# Patient Record
Sex: FEMALE | Race: WHITE | Employment: FULL TIME | URBAN - METROPOLITAN AREA
[De-identification: names, ages, dates, MRNs, and addresses within clinical notes are randomized per-mention and may not be internally consistent; named-entity substitution may affect disease eponyms.]

---

## 2018-08-27 ENCOUNTER — HOSPITAL ENCOUNTER (EMERGENCY)
Age: 43
Discharge: HOME | End: 2018-08-28

## 2018-08-27 ENCOUNTER — APPOINTMENT (OUTPATIENT)
Dept: GENERAL RADIOLOGY | Age: 43
End: 2018-08-27

## 2018-08-27 DIAGNOSIS — R07.9 CHEST PAIN, UNSPECIFIED TYPE: Primary | ICD-10-CM

## 2018-08-27 LAB
A/G RATIO: 1.3 (ref 1.1–2.2)
ALBUMIN SERPL-MCNC: 3.6 G/DL (ref 3.4–5)
ALP BLD-CCNC: 71 U/L (ref 40–129)
ALT SERPL-CCNC: 17 U/L (ref 10–40)
ANION GAP SERPL CALCULATED.3IONS-SCNC: 13 MMOL/L (ref 3–16)
AST SERPL-CCNC: 14 U/L (ref 15–37)
BASOPHILS ABSOLUTE: 0.1 K/UL (ref 0–0.2)
BASOPHILS RELATIVE PERCENT: 1.4 %
BILIRUB SERPL-MCNC: 0.6 MG/DL (ref 0–1)
BUN BLDV-MCNC: 13 MG/DL (ref 7–20)
CALCIUM SERPL-MCNC: 8.7 MG/DL (ref 8.3–10.6)
CHLORIDE BLD-SCNC: 103 MMOL/L (ref 99–110)
CO2: 22 MMOL/L (ref 21–32)
CREAT SERPL-MCNC: 0.6 MG/DL (ref 0.6–1.1)
EOSINOPHILS ABSOLUTE: 0.3 K/UL (ref 0–0.6)
EOSINOPHILS RELATIVE PERCENT: 3.2 %
GFR AFRICAN AMERICAN: >60
GFR NON-AFRICAN AMERICAN: >60
GLOBULIN: 2.7 G/DL
GLUCOSE BLD-MCNC: 132 MG/DL (ref 70–99)
HCT VFR BLD CALC: 32.9 % (ref 36–48)
HEMOGLOBIN: 10.7 G/DL (ref 12–16)
LYMPHOCYTES ABSOLUTE: 2.2 K/UL (ref 1–5.1)
LYMPHOCYTES RELATIVE PERCENT: 23.2 %
MAGNESIUM: 1.9 MG/DL (ref 1.8–2.4)
MCH RBC QN AUTO: 23.9 PG (ref 26–34)
MCHC RBC AUTO-ENTMCNC: 32.7 G/DL (ref 31–36)
MCV RBC AUTO: 73 FL (ref 80–100)
MONOCYTES ABSOLUTE: 0.5 K/UL (ref 0–1.3)
MONOCYTES RELATIVE PERCENT: 5.1 %
NEUTROPHILS ABSOLUTE: 6.4 K/UL (ref 1.7–7.7)
NEUTROPHILS RELATIVE PERCENT: 67.1 %
PDW BLD-RTO: 16 % (ref 12.4–15.4)
PHOSPHORUS: 3.2 MG/DL (ref 2.5–4.9)
PLATELET # BLD: 256 K/UL (ref 135–450)
PMV BLD AUTO: 7.8 FL (ref 5–10.5)
POTASSIUM SERPL-SCNC: 3.3 MMOL/L (ref 3.5–5.1)
PRO-BNP: 59 PG/ML (ref 0–124)
RBC # BLD: 4.5 M/UL (ref 4–5.2)
SODIUM BLD-SCNC: 138 MMOL/L (ref 136–145)
TOTAL PROTEIN: 6.3 G/DL (ref 6.4–8.2)
TROPONIN: <0.01 NG/ML
WBC # BLD: 9.5 K/UL (ref 4–11)

## 2018-08-27 PROCEDURE — 83735 ASSAY OF MAGNESIUM: CPT

## 2018-08-27 PROCEDURE — 93005 ELECTROCARDIOGRAM TRACING: CPT | Performed by: PHYSICIAN ASSISTANT

## 2018-08-27 PROCEDURE — 85025 COMPLETE CBC W/AUTO DIFF WBC: CPT

## 2018-08-27 PROCEDURE — 99285 EMERGENCY DEPT VISIT HI MDM: CPT

## 2018-08-27 PROCEDURE — 6370000000 HC RX 637 (ALT 250 FOR IP): Performed by: PHYSICIAN ASSISTANT

## 2018-08-27 PROCEDURE — 84100 ASSAY OF PHOSPHORUS: CPT

## 2018-08-27 PROCEDURE — 83880 ASSAY OF NATRIURETIC PEPTIDE: CPT

## 2018-08-27 PROCEDURE — 80053 COMPREHEN METABOLIC PANEL: CPT

## 2018-08-27 PROCEDURE — 71046 X-RAY EXAM CHEST 2 VIEWS: CPT

## 2018-08-27 PROCEDURE — 84484 ASSAY OF TROPONIN QUANT: CPT

## 2018-08-27 RX ORDER — ASPIRIN 81 MG/1
324 TABLET, CHEWABLE ORAL ONCE
Status: COMPLETED | OUTPATIENT
Start: 2018-08-27 | End: 2018-08-27

## 2018-08-27 RX ORDER — POTASSIUM CHLORIDE 750 MG/1
40 TABLET, FILM COATED, EXTENDED RELEASE ORAL ONCE
Status: COMPLETED | OUTPATIENT
Start: 2018-08-27 | End: 2018-08-28

## 2018-08-27 RX ADMIN — ASPIRIN 81 MG 324 MG: 81 TABLET ORAL at 23:34

## 2018-08-27 ASSESSMENT — PAIN SCALES - GENERAL: PAINLEVEL_OUTOF10: 8

## 2018-08-27 ASSESSMENT — PAIN DESCRIPTION - PAIN TYPE: TYPE: ACUTE PAIN

## 2018-08-27 ASSESSMENT — PAIN DESCRIPTION - LOCATION: LOCATION: CHEST

## 2018-08-28 VITALS
TEMPERATURE: 98.5 F | HEART RATE: 84 BPM | WEIGHT: 230 LBS | BODY MASS INDEX: 36.96 KG/M2 | RESPIRATION RATE: 16 BRPM | OXYGEN SATURATION: 98 % | SYSTOLIC BLOOD PRESSURE: 139 MMHG | DIASTOLIC BLOOD PRESSURE: 88 MMHG | HEIGHT: 66 IN

## 2018-08-28 LAB — TROPONIN: <0.01 NG/ML

## 2018-08-28 PROCEDURE — 84484 ASSAY OF TROPONIN QUANT: CPT

## 2018-08-28 PROCEDURE — 6370000000 HC RX 637 (ALT 250 FOR IP): Performed by: PHYSICIAN ASSISTANT

## 2018-08-28 RX ADMIN — POTASSIUM CHLORIDE 40 MEQ: 750 TABLET, EXTENDED RELEASE ORAL at 00:10

## 2018-08-28 NOTE — ED PROVIDER NOTES
return to ED for new/worsening symptoms. No medications prescribed at discharge.     MDM  Results for orders placed or performed during the hospital encounter of 08/27/18   CBC Auto Differential   Result Value Ref Range    WBC 9.5 4.0 - 11.0 K/uL    RBC 4.50 4.00 - 5.20 M/uL    Hemoglobin 10.7 (L) 12.0 - 16.0 g/dL    Hematocrit 32.9 (L) 36.0 - 48.0 %    MCV 73.0 (L) 80.0 - 100.0 fL    MCH 23.9 (L) 26.0 - 34.0 pg    MCHC 32.7 31.0 - 36.0 g/dL    RDW 16.0 (H) 12.4 - 15.4 %    Platelets 982 000 - 688 K/uL    MPV 7.8 5.0 - 10.5 fL    Neutrophils % 67.1 %    Lymphocytes % 23.2 %    Monocytes % 5.1 %    Eosinophils % 3.2 %    Basophils % 1.4 %    Neutrophils # 6.4 1.7 - 7.7 K/uL    Lymphocytes # 2.2 1.0 - 5.1 K/uL    Monocytes # 0.5 0.0 - 1.3 K/uL    Eosinophils # 0.3 0.0 - 0.6 K/uL    Basophils # 0.1 0.0 - 0.2 K/uL   Comprehensive Metabolic Panel   Result Value Ref Range    Sodium 138 136 - 145 mmol/L    Potassium 3.3 (L) 3.5 - 5.1 mmol/L    Chloride 103 99 - 110 mmol/L    CO2 22 21 - 32 mmol/L    Anion Gap 13 3 - 16    Glucose 132 (H) 70 - 99 mg/dL    BUN 13 7 - 20 mg/dL    CREATININE 0.6 0.6 - 1.1 mg/dL    GFR Non-African American >60 >60    GFR African American >60 >60    Calcium 8.7 8.3 - 10.6 mg/dL    Total Protein 6.3 (L) 6.4 - 8.2 g/dL    Alb 3.6 3.4 - 5.0 g/dL    Albumin/Globulin Ratio 1.3 1.1 - 2.2    Total Bilirubin 0.6 0.0 - 1.0 mg/dL    Alkaline Phosphatase 71 40 - 129 U/L    ALT 17 10 - 40 U/L    AST 14 (L) 15 - 37 U/L    Globulin 2.7 g/dL   Troponin   Result Value Ref Range    Troponin <0.01 <0.01 ng/mL   Brain Natriuretic Peptide   Result Value Ref Range    Pro-BNP 59 0 - 124 pg/mL   Magnesium   Result Value Ref Range    Magnesium 1.90 1.80 - 2.40 mg/dL   Phosphorus   Result Value Ref Range    Phosphorus 3.2 2.5 - 4.9 mg/dL   Troponin   Result Value Ref Range    Troponin <0.01 <0.01 ng/mL     I estimate there is LOW risk for ACUTE CORONARY SYNDROME, INTRACRANIAL HEMORRHAGE, MALIGNANT DYSRHYTHMIA or

## 2018-08-29 LAB
EKG ATRIAL RATE: 102 BPM
EKG DIAGNOSIS: NORMAL
EKG P AXIS: 47 DEGREES
EKG P-R INTERVAL: 146 MS
EKG Q-T INTERVAL: 358 MS
EKG QRS DURATION: 86 MS
EKG QTC CALCULATION (BAZETT): 466 MS
EKG R AXIS: 51 DEGREES
EKG T AXIS: 8 DEGREES
EKG VENTRICULAR RATE: 102 BPM

## 2018-08-29 PROCEDURE — 93010 ELECTROCARDIOGRAM REPORT: CPT | Performed by: INTERNAL MEDICINE

## 2019-03-12 ENCOUNTER — APPOINTMENT (OUTPATIENT)
Dept: CT IMAGING | Age: 44
End: 2019-03-12

## 2019-03-12 ENCOUNTER — HOSPITAL ENCOUNTER (EMERGENCY)
Age: 44
Discharge: HOME | End: 2019-03-12
Attending: EMERGENCY MEDICINE

## 2019-03-12 VITALS
SYSTOLIC BLOOD PRESSURE: 125 MMHG | RESPIRATION RATE: 14 BRPM | OXYGEN SATURATION: 98 % | HEART RATE: 84 BPM | DIASTOLIC BLOOD PRESSURE: 70 MMHG | TEMPERATURE: 98.6 F

## 2019-03-12 DIAGNOSIS — R11.0 NAUSEA: Primary | ICD-10-CM

## 2019-03-12 DIAGNOSIS — R10.13 ABDOMINAL PAIN, EPIGASTRIC: ICD-10-CM

## 2019-03-12 DIAGNOSIS — R19.7 DIARRHEA, UNSPECIFIED TYPE: ICD-10-CM

## 2019-03-12 LAB
A/G RATIO: 1.2 (ref 1.1–2.2)
ALBUMIN SERPL-MCNC: 3.7 G/DL (ref 3.4–5)
ALP BLD-CCNC: 79 U/L (ref 40–129)
ALT SERPL-CCNC: 23 U/L (ref 10–40)
ANION GAP SERPL CALCULATED.3IONS-SCNC: 12 MMOL/L (ref 3–16)
AST SERPL-CCNC: 14 U/L (ref 15–37)
BACTERIA: ABNORMAL /HPF
BASOPHILS ABSOLUTE: 0.1 K/UL (ref 0–0.2)
BASOPHILS RELATIVE PERCENT: 1 %
BILIRUB SERPL-MCNC: 0.9 MG/DL (ref 0–1)
BILIRUBIN URINE: NEGATIVE
BLOOD, URINE: ABNORMAL
BUN BLDV-MCNC: 8 MG/DL (ref 7–20)
CALCIUM SERPL-MCNC: 8.8 MG/DL (ref 8.3–10.6)
CHLORIDE BLD-SCNC: 101 MMOL/L (ref 99–110)
CLARITY: CLEAR
CO2: 23 MMOL/L (ref 21–32)
COLOR: YELLOW
CREAT SERPL-MCNC: <0.5 MG/DL (ref 0.6–1.1)
EOSINOPHILS ABSOLUTE: 0 K/UL (ref 0–0.6)
EOSINOPHILS RELATIVE PERCENT: 0.7 %
EPITHELIAL CELLS, UA: ABNORMAL /HPF
GFR AFRICAN AMERICAN: >60
GFR NON-AFRICAN AMERICAN: >60
GLOBULIN: 3 G/DL
GLUCOSE BLD-MCNC: 90 MG/DL (ref 70–99)
GLUCOSE URINE: NEGATIVE MG/DL
HCG QUALITATIVE: NEGATIVE
HCT VFR BLD CALC: 37.5 % (ref 36–48)
HEMOGLOBIN: 12.2 G/DL (ref 12–16)
KETONES, URINE: NEGATIVE MG/DL
LEUKOCYTE ESTERASE, URINE: ABNORMAL
LIPASE: 25 U/L (ref 13–60)
LYMPHOCYTES ABSOLUTE: 1 K/UL (ref 1–5.1)
LYMPHOCYTES RELATIVE PERCENT: 15.4 %
MAGNESIUM: 1.7 MG/DL (ref 1.8–2.4)
MCH RBC QN AUTO: 24.8 PG (ref 26–34)
MCHC RBC AUTO-ENTMCNC: 32.7 G/DL (ref 31–36)
MCV RBC AUTO: 76 FL (ref 80–100)
MICROSCOPIC EXAMINATION: YES
MONOCYTES ABSOLUTE: 0.4 K/UL (ref 0–1.3)
MONOCYTES RELATIVE PERCENT: 6.5 %
NEUTROPHILS ABSOLUTE: 4.9 K/UL (ref 1.7–7.7)
NEUTROPHILS RELATIVE PERCENT: 76.4 %
NITRITE, URINE: NEGATIVE
PDW BLD-RTO: 16.2 % (ref 12.4–15.4)
PH UA: 6 (ref 5–8)
PLATELET # BLD: 219 K/UL (ref 135–450)
PMV BLD AUTO: 8.2 FL (ref 5–10.5)
POTASSIUM REFLEX MAGNESIUM: 3.5 MMOL/L (ref 3.5–5.1)
PROTEIN UA: NEGATIVE MG/DL
RBC # BLD: 4.93 M/UL (ref 4–5.2)
RBC UA: ABNORMAL /HPF (ref 0–2)
SODIUM BLD-SCNC: 136 MMOL/L (ref 136–145)
SPECIFIC GRAVITY UA: <=1.005 (ref 1–1.03)
TOTAL PROTEIN: 6.7 G/DL (ref 6.4–8.2)
URINE TYPE: ABNORMAL
UROBILINOGEN, URINE: 0.2 E.U./DL
WBC # BLD: 6.4 K/UL (ref 4–11)
WBC UA: ABNORMAL /HPF (ref 0–5)

## 2019-03-12 PROCEDURE — 6360000004 HC RX CONTRAST MEDICATION: Performed by: EMERGENCY MEDICINE

## 2019-03-12 PROCEDURE — 2580000003 HC RX 258: Performed by: EMERGENCY MEDICINE

## 2019-03-12 PROCEDURE — 96374 THER/PROPH/DIAG INJ IV PUSH: CPT

## 2019-03-12 PROCEDURE — 96375 TX/PRO/DX INJ NEW DRUG ADDON: CPT

## 2019-03-12 PROCEDURE — 85025 COMPLETE CBC W/AUTO DIFF WBC: CPT

## 2019-03-12 PROCEDURE — 84703 CHORIONIC GONADOTROPIN ASSAY: CPT

## 2019-03-12 PROCEDURE — 81001 URINALYSIS AUTO W/SCOPE: CPT

## 2019-03-12 PROCEDURE — 2500000003 HC RX 250 WO HCPCS: Performed by: EMERGENCY MEDICINE

## 2019-03-12 PROCEDURE — 83735 ASSAY OF MAGNESIUM: CPT

## 2019-03-12 PROCEDURE — 99284 EMERGENCY DEPT VISIT MOD MDM: CPT

## 2019-03-12 PROCEDURE — 74177 CT ABD & PELVIS W/CONTRAST: CPT

## 2019-03-12 PROCEDURE — 80053 COMPREHEN METABOLIC PANEL: CPT

## 2019-03-12 PROCEDURE — 6360000002 HC RX W HCPCS: Performed by: EMERGENCY MEDICINE

## 2019-03-12 PROCEDURE — 87086 URINE CULTURE/COLONY COUNT: CPT

## 2019-03-12 PROCEDURE — 96361 HYDRATE IV INFUSION ADD-ON: CPT

## 2019-03-12 PROCEDURE — 83690 ASSAY OF LIPASE: CPT

## 2019-03-12 RX ORDER — ONDANSETRON 2 MG/ML
4 INJECTION INTRAMUSCULAR; INTRAVENOUS EVERY 30 MIN PRN
Status: DISCONTINUED | OUTPATIENT
Start: 2019-03-12 | End: 2019-03-12 | Stop reason: HOSPADM

## 2019-03-12 RX ORDER — 0.9 % SODIUM CHLORIDE 0.9 %
1000 INTRAVENOUS SOLUTION INTRAVENOUS ONCE
Status: COMPLETED | OUTPATIENT
Start: 2019-03-12 | End: 2019-03-12

## 2019-03-12 RX ORDER — MORPHINE SULFATE 4 MG/ML
4 INJECTION, SOLUTION INTRAMUSCULAR; INTRAVENOUS
Status: DISCONTINUED | OUTPATIENT
Start: 2019-03-12 | End: 2019-03-12 | Stop reason: HOSPADM

## 2019-03-12 RX ORDER — ONDANSETRON 4 MG/1
4 TABLET, ORALLY DISINTEGRATING ORAL EVERY 8 HOURS PRN
Qty: 10 TABLET | Refills: 0 | Status: SHIPPED | OUTPATIENT
Start: 2019-03-12 | End: 2019-09-03 | Stop reason: ALTCHOICE

## 2019-03-12 RX ADMIN — IOPAMIDOL 75 ML: 755 INJECTION, SOLUTION INTRAVENOUS at 04:17

## 2019-03-12 RX ADMIN — ONDANSETRON 4 MG: 2 INJECTION INTRAMUSCULAR; INTRAVENOUS at 03:47

## 2019-03-12 RX ADMIN — SODIUM CHLORIDE 1000 ML: 9 INJECTION, SOLUTION INTRAVENOUS at 03:47

## 2019-03-12 RX ADMIN — FAMOTIDINE 20 MG: 10 INJECTION, SOLUTION INTRAVENOUS at 03:47

## 2019-03-13 LAB — URINE CULTURE, ROUTINE: NORMAL

## 2019-09-03 ENCOUNTER — HOSPITAL ENCOUNTER (EMERGENCY)
Age: 44
Discharge: HOME | End: 2019-09-04
Attending: EMERGENCY MEDICINE

## 2019-09-03 ENCOUNTER — APPOINTMENT (OUTPATIENT)
Dept: GENERAL RADIOLOGY | Age: 44
End: 2019-09-03

## 2019-09-03 DIAGNOSIS — R07.9 CHEST PAIN, UNSPECIFIED TYPE: Primary | ICD-10-CM

## 2019-09-03 LAB
A/G RATIO: 1.3 (ref 1.1–2.2)
ALBUMIN SERPL-MCNC: 3.9 G/DL (ref 3.4–5)
ALP BLD-CCNC: 72 U/L (ref 40–129)
ALT SERPL-CCNC: 16 U/L (ref 10–40)
ANION GAP SERPL CALCULATED.3IONS-SCNC: 10 MMOL/L (ref 3–16)
AST SERPL-CCNC: 14 U/L (ref 15–37)
BASOPHILS ABSOLUTE: 0.1 K/UL (ref 0–0.2)
BASOPHILS RELATIVE PERCENT: 1.9 %
BILIRUB SERPL-MCNC: <0.2 MG/DL (ref 0–1)
BUN BLDV-MCNC: 14 MG/DL (ref 7–20)
CALCIUM SERPL-MCNC: 9.5 MG/DL (ref 8.3–10.6)
CHLORIDE BLD-SCNC: 104 MMOL/L (ref 99–110)
CO2: 25 MMOL/L (ref 21–32)
CREAT SERPL-MCNC: 0.8 MG/DL (ref 0.6–1.1)
D DIMER: <200 NG/ML DDU (ref 0–229)
EOSINOPHILS ABSOLUTE: 0.4 K/UL (ref 0–0.6)
EOSINOPHILS RELATIVE PERCENT: 5.9 %
GFR AFRICAN AMERICAN: >60
GFR NON-AFRICAN AMERICAN: >60
GLOBULIN: 2.9 G/DL
GLUCOSE BLD-MCNC: 104 MG/DL (ref 70–99)
HCT VFR BLD CALC: 35.8 % (ref 36–48)
HEMOGLOBIN: 12 G/DL (ref 12–16)
LYMPHOCYTES ABSOLUTE: 2.5 K/UL (ref 1–5.1)
LYMPHOCYTES RELATIVE PERCENT: 34.5 %
MAGNESIUM: 2 MG/DL (ref 1.8–2.4)
MCH RBC QN AUTO: 25.7 PG (ref 26–34)
MCHC RBC AUTO-ENTMCNC: 33.6 G/DL (ref 31–36)
MCV RBC AUTO: 76.5 FL (ref 80–100)
MONOCYTES ABSOLUTE: 0.7 K/UL (ref 0–1.3)
MONOCYTES RELATIVE PERCENT: 10.5 %
NEUTROPHILS ABSOLUTE: 3.4 K/UL (ref 1.7–7.7)
NEUTROPHILS RELATIVE PERCENT: 47.2 %
PDW BLD-RTO: 16 % (ref 12.4–15.4)
PLATELET # BLD: 234 K/UL (ref 135–450)
PMV BLD AUTO: 8.5 FL (ref 5–10.5)
POTASSIUM SERPL-SCNC: 3.4 MMOL/L (ref 3.5–5.1)
RBC # BLD: 4.67 M/UL (ref 4–5.2)
SODIUM BLD-SCNC: 139 MMOL/L (ref 136–145)
TOTAL PROTEIN: 6.8 G/DL (ref 6.4–8.2)
TROPONIN: <0.01 NG/ML
WBC # BLD: 7.2 K/UL (ref 4–11)

## 2019-09-03 PROCEDURE — 96361 HYDRATE IV INFUSION ADD-ON: CPT

## 2019-09-03 PROCEDURE — 93005 ELECTROCARDIOGRAM TRACING: CPT | Performed by: EMERGENCY MEDICINE

## 2019-09-03 PROCEDURE — 85379 FIBRIN DEGRADATION QUANT: CPT

## 2019-09-03 PROCEDURE — 80053 COMPREHEN METABOLIC PANEL: CPT

## 2019-09-03 PROCEDURE — 2580000003 HC RX 258: Performed by: EMERGENCY MEDICINE

## 2019-09-03 PROCEDURE — 99285 EMERGENCY DEPT VISIT HI MDM: CPT

## 2019-09-03 PROCEDURE — 84484 ASSAY OF TROPONIN QUANT: CPT

## 2019-09-03 PROCEDURE — 85025 COMPLETE CBC W/AUTO DIFF WBC: CPT

## 2019-09-03 PROCEDURE — 6360000002 HC RX W HCPCS: Performed by: EMERGENCY MEDICINE

## 2019-09-03 PROCEDURE — 83735 ASSAY OF MAGNESIUM: CPT

## 2019-09-03 PROCEDURE — 71046 X-RAY EXAM CHEST 2 VIEWS: CPT

## 2019-09-03 PROCEDURE — 96374 THER/PROPH/DIAG INJ IV PUSH: CPT

## 2019-09-03 RX ORDER — KETOROLAC TROMETHAMINE 30 MG/ML
30 INJECTION, SOLUTION INTRAMUSCULAR; INTRAVENOUS ONCE
Status: COMPLETED | OUTPATIENT
Start: 2019-09-03 | End: 2019-09-03

## 2019-09-03 RX ORDER — 0.9 % SODIUM CHLORIDE 0.9 %
500 INTRAVENOUS SOLUTION INTRAVENOUS ONCE
Status: COMPLETED | OUTPATIENT
Start: 2019-09-03 | End: 2019-09-04

## 2019-09-03 RX ADMIN — SODIUM CHLORIDE 500 ML: 9 INJECTION, SOLUTION INTRAVENOUS at 23:14

## 2019-09-03 RX ADMIN — KETOROLAC TROMETHAMINE 30 MG: 30 INJECTION, SOLUTION INTRAMUSCULAR at 23:14

## 2019-09-03 ASSESSMENT — PAIN SCALES - GENERAL
PAINLEVEL_OUTOF10: 7
PAINLEVEL_OUTOF10: 7

## 2019-09-03 ASSESSMENT — PAIN DESCRIPTION - ORIENTATION: ORIENTATION: LEFT

## 2019-09-03 ASSESSMENT — PAIN DESCRIPTION - PAIN TYPE: TYPE: ACUTE PAIN

## 2019-09-03 ASSESSMENT — PAIN DESCRIPTION - LOCATION: LOCATION: CHEST

## 2019-09-04 VITALS
HEIGHT: 66 IN | RESPIRATION RATE: 19 BRPM | HEART RATE: 75 BPM | WEIGHT: 223 LBS | BODY MASS INDEX: 35.84 KG/M2 | OXYGEN SATURATION: 99 % | TEMPERATURE: 98.2 F | SYSTOLIC BLOOD PRESSURE: 153 MMHG | DIASTOLIC BLOOD PRESSURE: 75 MMHG

## 2019-09-04 LAB
EKG ATRIAL RATE: 83 BPM
EKG DIAGNOSIS: NORMAL
EKG P AXIS: 33 DEGREES
EKG P-R INTERVAL: 144 MS
EKG Q-T INTERVAL: 388 MS
EKG QRS DURATION: 84 MS
EKG QTC CALCULATION (BAZETT): 455 MS
EKG R AXIS: 44 DEGREES
EKG T AXIS: 18 DEGREES
EKG VENTRICULAR RATE: 83 BPM
TROPONIN: <0.01 NG/ML

## 2019-09-04 PROCEDURE — 93010 ELECTROCARDIOGRAM REPORT: CPT | Performed by: INTERNAL MEDICINE

## 2019-09-04 PROCEDURE — 84484 ASSAY OF TROPONIN QUANT: CPT

## 2019-09-04 ASSESSMENT — PAIN SCALES - GENERAL
PAINLEVEL_OUTOF10: 2
PAINLEVEL_OUTOF10: 2

## 2019-09-04 NOTE — ED NOTES
Patient discharged with instructions, medication teaching, follow up instructions, verbalizes understanding. Ambulates from Ed without assist, gait steady.      Mikki Wiseman RN  09/04/19 2126

## 2020-11-08 ENCOUNTER — HOSPITAL ENCOUNTER (EMERGENCY)
Age: 45
Discharge: HOME | End: 2020-11-09
Attending: EMERGENCY MEDICINE
Payer: COMMERCIAL

## 2020-11-08 ENCOUNTER — APPOINTMENT (OUTPATIENT)
Dept: GENERAL RADIOLOGY | Age: 45
End: 2020-11-08
Payer: COMMERCIAL

## 2020-11-08 LAB
ANION GAP SERPL CALCULATED.3IONS-SCNC: 8 MMOL/L (ref 3–16)
BASOPHILS ABSOLUTE: 0.2 K/UL (ref 0–0.2)
BASOPHILS RELATIVE PERCENT: 2.9 %
BUN BLDV-MCNC: 14 MG/DL (ref 7–20)
CALCIUM SERPL-MCNC: 8.5 MG/DL (ref 8.3–10.6)
CHLORIDE BLD-SCNC: 103 MMOL/L (ref 99–110)
CO2: 25 MMOL/L (ref 21–32)
CREAT SERPL-MCNC: 0.7 MG/DL (ref 0.6–1.1)
EOSINOPHILS ABSOLUTE: 0.3 K/UL (ref 0–0.6)
EOSINOPHILS RELATIVE PERCENT: 4.7 %
GFR AFRICAN AMERICAN: >60
GFR NON-AFRICAN AMERICAN: >60
GLUCOSE BLD-MCNC: 103 MG/DL (ref 70–99)
HCT VFR BLD CALC: 33.2 % (ref 36–48)
HEMOGLOBIN: 11.1 G/DL (ref 12–16)
LYMPHOCYTES ABSOLUTE: 2 K/UL (ref 1–5.1)
LYMPHOCYTES RELATIVE PERCENT: 27.6 %
MAGNESIUM: 1.9 MG/DL (ref 1.8–2.4)
MCH RBC QN AUTO: 25.6 PG (ref 26–34)
MCHC RBC AUTO-ENTMCNC: 33.4 G/DL (ref 31–36)
MCV RBC AUTO: 76.7 FL (ref 80–100)
MONOCYTES ABSOLUTE: 0.8 K/UL (ref 0–1.3)
MONOCYTES RELATIVE PERCENT: 10.9 %
NEUTROPHILS ABSOLUTE: 4 K/UL (ref 1.7–7.7)
NEUTROPHILS RELATIVE PERCENT: 53.9 %
PDW BLD-RTO: 15.1 % (ref 12.4–15.4)
PLATELET # BLD: 262 K/UL (ref 135–450)
PMV BLD AUTO: 7.8 FL (ref 5–10.5)
POTASSIUM REFLEX MAGNESIUM: 3.5 MMOL/L (ref 3.5–5.1)
PRO-BNP: 42 PG/ML (ref 0–124)
RBC # BLD: 4.33 M/UL (ref 4–5.2)
SODIUM BLD-SCNC: 136 MMOL/L (ref 136–145)
TROPONIN: <0.01 NG/ML
WBC # BLD: 7.4 K/UL (ref 4–11)

## 2020-11-08 PROCEDURE — 85025 COMPLETE CBC W/AUTO DIFF WBC: CPT

## 2020-11-08 PROCEDURE — 83735 ASSAY OF MAGNESIUM: CPT

## 2020-11-08 PROCEDURE — 80048 BASIC METABOLIC PNL TOTAL CA: CPT

## 2020-11-08 PROCEDURE — 83880 ASSAY OF NATRIURETIC PEPTIDE: CPT

## 2020-11-08 PROCEDURE — 71045 X-RAY EXAM CHEST 1 VIEW: CPT

## 2020-11-08 PROCEDURE — 93005 ELECTROCARDIOGRAM TRACING: CPT | Performed by: EMERGENCY MEDICINE

## 2020-11-08 PROCEDURE — 84484 ASSAY OF TROPONIN QUANT: CPT

## 2020-11-08 PROCEDURE — 36415 COLL VENOUS BLD VENIPUNCTURE: CPT

## 2020-11-08 PROCEDURE — 99284 EMERGENCY DEPT VISIT MOD MDM: CPT

## 2020-11-08 ASSESSMENT — ENCOUNTER SYMPTOMS
VOMITING: 0
CHEST TIGHTNESS: 1
SHORTNESS OF BREATH: 1
ABDOMINAL PAIN: 0
NAUSEA: 0

## 2020-11-08 ASSESSMENT — PAIN DESCRIPTION - PAIN TYPE: TYPE: ACUTE PAIN

## 2020-11-08 ASSESSMENT — PAIN DESCRIPTION - LOCATION: LOCATION: CHEST

## 2020-11-08 ASSESSMENT — HEART SCORE: ECG: 0

## 2020-11-08 ASSESSMENT — PAIN DESCRIPTION - DESCRIPTORS: DESCRIPTORS: ACHING

## 2020-11-08 ASSESSMENT — PAIN SCALES - GENERAL: PAINLEVEL_OUTOF10: 6

## 2020-11-08 ASSESSMENT — PAIN DESCRIPTION - FREQUENCY: FREQUENCY: CONTINUOUS

## 2020-11-08 NOTE — ED PROVIDER NOTES
[address] [facility]          PHYSICIAN ASSISTANT NOTE       Date of evaluation: 11/8/2020    Chief Complaint     Chest Pain    History of Present Illness     Candelario Zeng is a 39 y.o. female with a reported history of atrial fibrillation, not identified on chart review, not on blood thinners and not on any rate control medications presenting to the emergency department for evaluation of chest tightness. Around 5 PM she was sitting on the couch when she developed sudden onset of central chest tightness, feeling like a 500 pound elephant was sitting on her chest.  Pain radiated slightly to her left shoulder. Mild associated shortness of breath with onset of symptoms. She was concerned that she may be in a fib as she has felt similar in the past with these episodes. She took her blood pressure which was slightly elevated, heart rate was 98. She had no fluttering sensation in her chest or palpitations similar to previous episodes of A. Fib. No extremity swelling, no recent cough, no fevers or exposures to COVID-19 during the current pandemic. She had a stress test about 5 years ago which was normal.  She has a family history of heart disease, does smoke, no other known cardiac risk factors. Review of Systems     Review of Systems   Constitutional: Negative for chills, diaphoresis, fatigue and fever. Respiratory: Positive for chest tightness and shortness of breath. Cardiovascular: Positive for palpitations. Negative for leg swelling. Gastrointestinal: Negative for abdominal pain, nausea and vomiting. Neurological: Negative for dizziness and light-headedness. Past Medical, Surgical, Family, and Social History     She has a past medical history of Atrial fibrillation ([facility] [address] She has a past surgical history that includes Cholecystectomy (11-6-2015).   Her family history includes Asthma in her mother; Diabetes in her father; Heart Disease in her father and mother; High Blood Pressure in her father and mother; High Cholesterol in her father; Zach Rodriguez / Macarena Crawley in her mother. She reports that she has been smoking. She has a 15.00 pack-year smoking history. She has never used smokeless tobacco. She reports current alcohol use. She reports that she does not use drugs. Medications     Previous Medications    No medications on file       Allergies     She is allergic to penicillins. Physical Exam     INITIAL VITALS: BP: (!) 167/84, Temp: 98.1 °F (36.7 °C), Pulse: 86, Resp: 20, SpO2: 99 %  Physical Exam  Vitals signs and nursing note reviewed. Constitutional:       Appearance: Normal appearance. HENT:      Head: Normocephalic. Neck:      Musculoskeletal: Normal range of motion. Cardiovascular:      Rate and Rhythm: Normal rate and regular rhythm. Pulses: Normal pulses. Heart sounds: Normal heart sounds. No murmur. No friction rub. No gallop. Pulmonary:      Effort: Pulmonary effort is normal. No respiratory distress. Breath sounds: Normal breath sounds. Chest:      Chest wall: No tenderness. Abdominal:      General: Abdomen is flat. Tenderness: There is no abdominal tenderness. Musculoskeletal: Normal range of motion. Right lower leg: No edema. Left lower leg: No edema. Neurological:      General: No focal deficit present. Mental Status: She is alert. Psychiatric:         Mood and Affect: Mood normal.         Behavior: Behavior normal.       Diagnostic Results     EKG   Interpreted in conjunction with emergency department physician No att. providers found  Rhythm: normal sinus   Rate: normal  Axis: normal  Ectopy: none  Conduction: normal  ST Segments: normal  T Waves: normal  Q Waves:none  Clinical Impression: no acute changes  Comparison:  8/27/18    RADIOLOGY:  XR CHEST PORTABLE   Final Result      No acute disease.                     LABS:   Results for orders placed or performed during the hospital encounter of 11/08/20   CBC Auto Differential   Result Value Ref Range    WBC 7.4 4.0 - 11.0 K/uL    RBC 4.33 4.00 - 5.20 M/uL    Hemoglobin 11.1 (L) 12.0 - 16.0 g/dL    Hematocrit 33.2 (L) 36.0 - 48.0 %    MCV 76.7 (L) 80.0 - 100.0 fL    MCH 25.6 (L) 26.0 - 34.0 pg    MCHC 33.4 31.0 - 36.0 g/dL    RDW 15.1 12.4 - 15.4 %    Platelets 418 058 - 174 K/uL    MPV 7.8 5.0 - 10.5 fL    Neutrophils % 53.9 %    Lymphocytes % 27.6 %    Monocytes % 10.9 %    Eosinophils % 4.7 %    Basophils % 2.9 %    Neutrophils Absolute 4.0 1.7 - 7.7 K/uL    Lymphocytes Absolute 2.0 1.0 - 5.1 K/uL    Monocytes Absolute 0.8 0.0 - 1.3 K/uL    Eosinophils Absolute 0.3 0.0 - 0.6 K/uL    Basophils Absolute 0.2 0.0 - 0.2 K/uL   Basic Metabolic Panel w/ Reflex to MG   Result Value Ref Range    Sodium 136 136 - 145 mmol/L    Potassium reflex Magnesium 3.5 3.5 - 5.1 mmol/L    Chloride 103 99 - 110 mmol/L    CO2 25 21 - 32 mmol/L    Anion Gap 8 3 - 16    Glucose 103 (H) 70 - 99 mg/dL    BUN 14 7 - 20 mg/dL    CREATININE 0.7 0.6 - 1.1 mg/dL    GFR Non-African American >60 >60    GFR African American >60 >60    Calcium 8.5 8.3 - 10.6 mg/dL   Troponin   Result Value Ref Range    Troponin <0.01 <0.01 ng/mL   Brain Natriuretic Peptide   Result Value Ref Range    Pro-BNP 42 0 - 124 pg/mL       ED BEDSIDE ULTRASOUND:  None    RECENT VITALS:  BP: (!) 156/80, Temp: 98.1 °F (36.7 °C), Pulse: 86, Resp: 22, SpO2: 99 %     Procedures   None    ED Course     Nursing Notes, Past Medical Hx,Past Surgical Hx, Social Hx, Allergies, and Family Hx were reviewed. The patient was given the following medications:  No orders of the defined types were placed in this encounter. CONSULTS:  None    MEDICAL DECISION MAKING / ASSESSMENT / PLAN     Nancy Aguilar is a 39 y.o. female presenting to the emergency department for evaluation of sudden onset of chest tightness that started around 5 PM this evening while she was watching TV.   Upon presentation chest pain had improved after receiving full dose aspirin as well as nitro by EMS. Vitals were stable and she was nontoxic in appearance. EKG completed showing normal sinus rhythm, no acute changes to indicate ischemia or infarction. Initial troponin was normal.  Chest x-ray showed no acute cardiopulmonary process, she does not clinically appear fluid overloaded with no history of CHF and normal BNP. Patient reports history of atrial fibrillation although this was not identified on chart review and appears to be normal sinus on EKG and remaining in this rhythm on telemetry. Patient has heart score of 3 for age, risk factors and history. Patient states it has been about 5 years since her last stress test.  She does have a primary care provider. Patient story is somewhat concerning for cardiac etiology and patient stated that she would like to stay for stress test in the morning. Felt that this was an appropriate disposition. Will continue to monitor symptoms and troponins overnight will in observation. This patient was also evaluated by the attending physician. All care plans were discussed and agreed upon. Clinical Impression     1. Chest pain, unspecified type        Disposition     PATIENT REFERRED TO:  No follow-up provider specified.     DISCHARGE MEDICATIONS:  New Prescriptions    No medications on file       DISPOSITION         Yasmine Hyman PA-C  11/08/20 1944

## 2020-11-08 NOTE — ED TRIAGE NOTES
Pt arrived to ED with chest pain that started around 5pm while watching TV. History of A. Fib but does not take medications for it. Received 324 aspirin en route along with 3 nitro. Chest pain improved with nitro from 9/10 to 6/10.

## 2020-11-09 VITALS
DIASTOLIC BLOOD PRESSURE: 79 MMHG | HEART RATE: 80 BPM | TEMPERATURE: 98.1 F | OXYGEN SATURATION: 97 % | RESPIRATION RATE: 28 BRPM | HEIGHT: 66 IN | BODY MASS INDEX: 36.96 KG/M2 | SYSTOLIC BLOOD PRESSURE: 183 MMHG | WEIGHT: 230 LBS

## 2020-11-09 LAB
EKG ATRIAL RATE: 87 BPM
EKG DIAGNOSIS: NORMAL
EKG P AXIS: 47 DEGREES
EKG P-R INTERVAL: 148 MS
EKG Q-T INTERVAL: 388 MS
EKG QRS DURATION: 84 MS
EKG QTC CALCULATION (BAZETT): 466 MS
EKG R AXIS: 49 DEGREES
EKG T AXIS: 8 DEGREES
EKG VENTRICULAR RATE: 87 BPM
LV EF: 78 %
LVEF MODALITY: NORMAL
TROPONIN: <0.01 NG/ML
TROPONIN: <0.01 NG/ML

## 2020-11-09 PROCEDURE — 2580000003 HC RX 258: Performed by: EMERGENCY MEDICINE

## 2020-11-09 PROCEDURE — 78452 HT MUSCLE IMAGE SPECT MULT: CPT

## 2020-11-09 PROCEDURE — 84484 ASSAY OF TROPONIN QUANT: CPT

## 2020-11-09 PROCEDURE — A9502 TC99M TETROFOSMIN: HCPCS | Performed by: EMERGENCY MEDICINE

## 2020-11-09 PROCEDURE — 93017 CV STRESS TEST TRACING ONLY: CPT

## 2020-11-09 PROCEDURE — 3430000000 HC RX DIAGNOSTIC RADIOPHARMACEUTICAL: Performed by: EMERGENCY MEDICINE

## 2020-11-09 RX ORDER — SODIUM CHLORIDE 0.9 % (FLUSH) 0.9 %
10 SYRINGE (ML) INJECTION 2 TIMES DAILY
Status: DISCONTINUED | OUTPATIENT
Start: 2020-11-09 | End: 2020-11-09 | Stop reason: HOSPADM

## 2020-11-09 RX ORDER — HYDROCHLOROTHIAZIDE 25 MG/1
25 TABLET ORAL DAILY
Qty: 30 TABLET | Refills: 0 | Status: SHIPPED | OUTPATIENT
Start: 2020-11-09

## 2020-11-09 RX ADMIN — TETROFOSMIN 35.2 MILLICURIE: 1.38 INJECTION, POWDER, LYOPHILIZED, FOR SOLUTION INTRAVENOUS at 08:52

## 2020-11-09 RX ADMIN — TETROFOSMIN 10 MILLICURIE: 1.38 INJECTION, POWDER, LYOPHILIZED, FOR SOLUTION INTRAVENOUS at 07:24

## 2020-11-09 RX ADMIN — Medication 10 ML: at 08:53

## 2020-11-09 RX ADMIN — Medication 10 ML: at 07:26

## 2020-11-09 NOTE — ED PROVIDER NOTES
distress vitals were remarkable for elevated blood pressure during her entire stay. Chest was clear no rales rhonchi wheezes. Cardiovascular [place] regular rate and rhythm without murmur gallop. Abdomen soft nontender. Good peripheral pulses. Neurological no focal deficits speech clear.      Odell Pérez MD  11/12/20 2999

## 2020-11-09 NOTE — ED NOTES
Bed: A07-07  Expected date:   Expected time:   Means of arrival:   Comments:  Nikia Mckeon RN  11/08/20 6424
Collected [place] blood tube and sent to lab for repeat troponin.       Marcos Freeman RN  11/09/20 6829
MD aware of BP.       Bill Leong RN  11/09/20 0405
Patient able to self ambulate to hallway bathroom and returned to room. Patient tolerated well.       Nishi Olson RN  11/09/20 3238
Pt resting in bed no needs at this time. Will continue to monitor.       Ruthie Chen, BRIAN  11/09/20 [address] Rhea Lundberg RN  11/09/20 1056
Smoker     Packs/day: 0.50     Years: 30.00     Pack years: 15.00    Smokeless tobacco: Never Used   Substance Use Topics    Alcohol use: Yes     Comment: ocassionally    Drug use: No        Medications      Previous Medications    No medications on file          Review of Systems      Review of Systems   Cardiovascular: Positive for chest pain. All other systems reviewed and are negative. Physical Examination      Physical Exam  Vitals signs and nursing note reviewed. Constitutional:       Appearance: Normal appearance. She is obese. HENT:      Head: Normocephalic and atraumatic. Eyes:      Extraocular Movements: Extraocular movements intact. Pupils: Pupils are equal, round, and reactive to light. Neck:      Musculoskeletal: Normal range of motion and neck supple. Cardiovascular:      Rate and Rhythm: Normal rate and regular rhythm. Pulses: Normal pulses. Heart sounds: Normal heart sounds. Pulmonary:      Effort: Pulmonary effort is normal.      Breath sounds: Normal breath sounds. Abdominal:      General: There is no distension. Tenderness: There is no abdominal tenderness. Musculoskeletal: Normal range of motion. Right lower leg: No edema. Left lower leg: No edema. Skin:     General: Skin is warm. Capillary Refill: Capillary refill takes less than 2 seconds. Neurological:      General: No focal deficit present. Mental Status: She is alert and oriented to person, place, and time. Mental status is at baseline.           Alessandro Farah MD  11/09/20 8423

## 2020-12-28 ENCOUNTER — HOSPITAL ENCOUNTER (OUTPATIENT)
Dept: MAMMOGRAPHY | Age: 45
Discharge: HOME | End: 2020-12-28
Payer: COMMERCIAL

## 2020-12-28 PROCEDURE — 77063 BREAST TOMOSYNTHESIS BI: CPT

## 2022-08-15 VITALS
DIASTOLIC BLOOD PRESSURE: 102 MMHG | SYSTOLIC BLOOD PRESSURE: 189 MMHG | BODY MASS INDEX: 36.96 KG/M2 | HEART RATE: 77 BPM | TEMPERATURE: 97.3 F | HEIGHT: 66 IN | OXYGEN SATURATION: 99 % | RESPIRATION RATE: 14 BRPM | WEIGHT: 230 LBS

## 2022-08-15 PROCEDURE — 99283 EMERGENCY DEPT VISIT LOW MDM: CPT

## 2022-08-15 ASSESSMENT — PAIN DESCRIPTION - DESCRIPTORS: DESCRIPTORS: ACHING

## 2022-08-15 ASSESSMENT — PAIN - FUNCTIONAL ASSESSMENT: PAIN_FUNCTIONAL_ASSESSMENT: 0-10

## 2022-08-15 ASSESSMENT — PAIN DESCRIPTION - ORIENTATION: ORIENTATION: LEFT

## 2022-08-15 ASSESSMENT — PAIN DESCRIPTION - LOCATION: LOCATION: KNEE

## 2022-08-15 ASSESSMENT — PAIN SCALES - GENERAL: PAINLEVEL_OUTOF10: 8

## 2022-08-16 ENCOUNTER — APPOINTMENT (OUTPATIENT)
Dept: GENERAL RADIOLOGY | Age: 47
End: 2022-08-16
Payer: COMMERCIAL

## 2022-08-16 ENCOUNTER — HOSPITAL ENCOUNTER (EMERGENCY)
Age: 47
Discharge: HOME | End: 2022-08-16
Payer: COMMERCIAL

## 2022-08-16 DIAGNOSIS — M25.562 ACUTE PAIN OF LEFT KNEE: Primary | ICD-10-CM

## 2022-08-16 PROCEDURE — 73562 X-RAY EXAM OF KNEE 3: CPT

## 2022-08-16 PROCEDURE — 6370000000 HC RX 637 (ALT 250 FOR IP): Performed by: PHYSICIAN ASSISTANT

## 2022-08-16 RX ORDER — KETOROLAC TROMETHAMINE 10 MG/1
10 TABLET, FILM COATED ORAL EVERY 6 HOURS PRN
Qty: 20 TABLET | Refills: 0 | Status: SHIPPED | OUTPATIENT
Start: 2022-08-16 | End: 2022-09-25

## 2022-08-16 RX ORDER — HYDROCODONE BITARTRATE AND ACETAMINOPHEN 5; 325 MG/1; MG/1
1 TABLET ORAL ONCE
Status: COMPLETED | OUTPATIENT
Start: 2022-08-16 | End: 2022-08-16

## 2022-08-16 RX ADMIN — HYDROCODONE BITARTRATE AND ACETAMINOPHEN 1 TABLET: 5; 325 TABLET ORAL at 01:10

## 2022-08-16 NOTE — ED NOTES
Per Shahriar HYMAN, placed 16 inch knee immobilizer on patients Left Leg. Patient tolerated well and verbalized understanding of care taking instructions. ED RN aware of completion and at bedside to discharge patient.      Carmencita Barber  08/16/22 0110

## 2022-08-16 NOTE — ED PROVIDER NOTES
[facility] Emergency Department    CHIEF COMPLAINT  Knee Pain (Left knee pain, no injury, a few days of pain)      SHARED SERVICE VISIT:  Evaluated by ZULEIKA. My supervising physician was available for consultation. HISTORY OF PRESENT ILLNESS  Saida Adorno is a 55 y.o. female who presents to the ED complaining of several day history of worsening left knee pain. Patient brought in by  today for evaluation. Patient denies trauma or injury. States that she has been having some left knee pain for the past several days. No prior history of injury or trauma to the extremity. She rates pain at a 4-5 out of 10. Worse with weightbearing and movement. Burning sensation. Does not radiate. She denies any numbness, tingling, weakness of the extremity. No calf pain or swelling. She denies any back pain. No other complaints, modifying factors or associated symptoms. Nursing notes reviewed.    Past Medical History:   Diagnosis Date    Atrial fibrillation [facility])      Past Surgical History:   Procedure Laterality Date    CHOLECYSTECTOMY  11-6-2015     Family History   Problem Relation Age of Onset    Asthma Mother     Heart Disease Mother     High Blood Pressure Mother     Miscarriages / Stillbirths Mother     Diabetes Father     Heart Disease Father     High Blood Pressure Father     High Cholesterol Father      Social History     Socioeconomic History    Marital status:      Spouse name: Not on file    Number of children: Not on file    Years of education: Not on file    Highest education level: Not on file   Occupational History    Not on file   Tobacco Use    Smoking status: Every Day     Packs/day: 0.50     Years: 30.00     Pack years: 15.00     Types: Cigarettes    Smokeless tobacco: Never   Vaping Use    Vaping Use: Not on file   Substance and Sexual Activity    Alcohol use: Yes     Comment: ocassionally    Drug use: No    Sexual activity: Yes     Partners: Male Other Topics Concern    Not on file   Social History Narrative    Not on file     Social Determinants of Health     Financial Resource Strain: Not on file   Food Insecurity: Not on file   Transportation Needs: Not on file   Physical Activity: Not on file   Stress: Not on file   Social Connections: Not on file   Intimate Partner Violence: Not on file   Housing Stability: Not on file     Current Facility-Administered Medications   Medication Dose Route Frequency Provider Last Rate Last Admin    HYDROcodone-acetaminophen (NORCO) 5-325 MG per tablet 1 tablet  [facility] [place]         Current Outpatient Medications   Medication Sig Dispense Refill    ketorolac (TORADOL) 10 MG tablet Take 1 tablet by mouth every 6 hours as needed for Pain 20 tablet 0    hydroCHLOROthiazide (HYDRODIURIL) 25 MG tablet Take 1 tablet by mouth daily 30 tablet 0     Allergies   Allergen Reactions    Penicillins Hives       REVIEW OF SYSTEMS  6 systems reviewed, pertinent positives per HPI otherwise noted to be negative    PHYSICAL EXAM  BP (!) 189/102   Pulse 77   Temp 97.3 °F (36.3 °C) (Temporal)   Resp 14   Ht 5' 6\" (1.676 m)   Wt 230 lb (104.3 kg)   LMP 08/15/2022   SpO2 99%   BMI 37.12 kg/m²   GENERAL APPEARANCE: Awake and alert. Cooperative. No acute distress. HEAD: Normocephalic. Atraumatic. EYES: PERRL. EOM's grossly intact. ENT: Mucous membranes are moist.   NECK: Supple. Normal ROM. CHEST: Equal symmetric chest rise. LUNGS: Breathing is unlabored. Speaking comfortably in full sentences. Abdomen: Nondistended  EXTREMITIES: MAEE. On exam of the left lower extremity patient with some global discomfort to the left knee without deformity or step-off. Worse along the medial joint line. No appreciable joint effusion. No overlying redness or warmth. Normal range of motion and strength testing although does elicit pain. No unilateral calf pain, redness or swelling. No palpable cords or masses. Pedal pulses are +2 and cap refill remains less than 5 seconds. SKIN: Warm and dry. NEUROLOGICAL: Alert and oriented. Strength is 5/5 in all extremities and sensation is intact. RADIOLOGY  XR KNEE LEFT (3 VIEWS)    Result Date: 8/16/2022  EXAMINATION: THREE XRAY VIEWS OF THE LEFT KNEE 8/16/2022 12:16 am COMPARISON: None. HISTORY: ORDERING SYSTEM PROVIDED HISTORY: pain TECHNOLOGIST PROVIDED HISTORY: Reason for exam:->pain Reason for Exam: lt knee pain x 3 days  nki FINDINGS: No acute fracture or dislocation at the left knee. No cortical erosion or periosteal reaction. The joint spaces are preserved. There is some minimal spurring of the tibial spines and medial edge of the tibial plateau. Small amount of joint effusion is present. There are no radiopaque foreign bodies. No acute fracture, dislocation, or cortical erosion. Some early osteoarthritis and small amount of joint effusion. ED COURSE   Patient received Norco for pain, with good relief. X-ray imaging without evidence for fractures dislocations. Mild arthritis with small joint effusion noted. A Velcro knee immobilizer was applied to the left leg by nursing staff. Patient remained neurovascularly intact status post application. Will be discharged with recommendations for rest, ice and elevation as well as close outpatient orthopedic follow-up. We have discussed return precautions and recommendations for follow-up otherwise. Patient is in agreement and comfortable at discharge. A discussion was had with Ms. Delmi Serna regarding knee pain, ED findings and recommendations for follow-up. Risk management discussed and shared decision making had with patient and/or surrogate. All questions were answered. Patient will follow up with orthopedist within 1 week as needed for further evaluation/treatment. Patient will return to ED for new/worsening symptoms. Patient was sent home with a prescription for Toradol.     MDM  I estimate there is LOW risk for COMPARTMENT SYNDROME, DEEP VENOUS THROMBOSIS, SEPTIC ARTHRITIS, TENDON OR NEUROVASCULAR INJURY, thus I consider the discharge disposition reasonable. Bryanna Avila and I have discussed the diagnosis and risks, and we agree with discharging home to follow-up with their primary doctor or the referral orthopedist. We also discussed returning to the Emergency Department immediately if new or worsening symptoms occur. We have discussed the symptoms which are most concerning (e.g., changing or worsening pain, numbness, weakness) that necessitate immediate return. Final Impression  1. Acute pain of left knee      Blood pressure (!) 189/102, pulse 77, temperature 97.3 °F (36.3 °C), temperature source Temporal, resp. rate 14, height 5' 6\" (1.676 m), weight 230 lb (104.3 kg), last menstrual period 08/15/2022, SpO2 99 %, not currently breastfeeding. DISPOSITION  Patient was discharged to home in good condition.             [place], [place]  08/16/22 9137

## 2022-09-25 ENCOUNTER — HOSPITAL ENCOUNTER (EMERGENCY)
Age: 47
Discharge: HOME | End: 2022-09-25
Attending: EMERGENCY MEDICINE
Payer: COMMERCIAL

## 2022-09-25 VITALS
HEIGHT: 66 IN | BODY MASS INDEX: 35.36 KG/M2 | OXYGEN SATURATION: 99 % | SYSTOLIC BLOOD PRESSURE: 185 MMHG | DIASTOLIC BLOOD PRESSURE: 101 MMHG | RESPIRATION RATE: 16 BRPM | WEIGHT: 220 LBS | HEART RATE: 96 BPM | TEMPERATURE: 97 F

## 2022-09-25 DIAGNOSIS — K04.7 DENTAL ABSCESS: Primary | ICD-10-CM

## 2022-09-25 DIAGNOSIS — R03.0 ELEVATED BLOOD PRESSURE READING: ICD-10-CM

## 2022-09-25 PROCEDURE — 99283 EMERGENCY DEPT VISIT LOW MDM: CPT

## 2022-09-25 RX ORDER — METRONIDAZOLE 500 MG/1
500 TABLET ORAL 2 TIMES DAILY
Qty: 14 TABLET | Refills: 0 | Status: SHIPPED | OUTPATIENT
Start: 2022-09-25 | End: 2022-10-02

## 2022-09-25 RX ORDER — IBUPROFEN 600 MG/1
600 TABLET ORAL EVERY 6 HOURS PRN
Qty: 40 TABLET | Refills: 0 | Status: SHIPPED | OUTPATIENT
Start: 2022-09-25

## 2022-09-25 ASSESSMENT — PAIN SCALES - GENERAL: PAINLEVEL_OUTOF10: 5

## 2022-09-25 ASSESSMENT — PAIN - FUNCTIONAL ASSESSMENT: PAIN_FUNCTIONAL_ASSESSMENT: 0-10

## 2022-09-25 NOTE — ED PROVIDER NOTES
CHIEF COMPLAINT  Mouth Injury (Infection on roof of mouth started yesterday )      HISTORY OF PRESENT ILLNESS  Kris Lawson  is a 52 y.o. female who presents to the ED at via private vehicle complaining of suspected infection along the roof of her mouth. Patient reports history of poor dentition and in fact has necrotic teeth to the gumline across the majority of the upper jaw. Patient has noted swelling over the left frontal inner gumline/palate. Patient states that it began draining earlier this evening. Patient denies fever, chills, sweats. Pain is rated as 5/10. There are no other complaints, modifying factors or associated symptoms. Nursing notes reviewed. Past medical history:  has a past medical history of Atrial fibrillation ([facility] [address] Past surgical history:  has a past surgical history that includes Cholecystectomy (11-6-2015). Home medications:   Prior to Admission medications    Medication Sig Start Date End Date Taking? Authorizing Provider   metroNIDAZOLE (FLAGYL) 500 MG tablet Take 1 tablet by mouth 2 times daily for 7 days 9/25/22 10/2/22 Yes Verlyn Ee, DO   ibuprofen (IBU) 600 MG tablet Take 1 tablet by mouth every 6 hours as needed for Pain 9/25/22  Yes Verlyn Ee, DO   hydroCHLOROthiazide (HYDRODIURIL) 25 MG tablet Take 1 tablet by mouth daily 11/9/20   Roseann Correa MD       Allergies   Allergen Reactions    Penicillins Hives       Social history:  reports that she has been smoking. She has a 15.00 pack-year smoking history. She has never used smokeless tobacco. She reports current alcohol use. She reports that she does not use drugs.     Family history:    Family History   Problem Relation Age of Onset    Asthma Mother     Heart Disease Mother     High Blood Pressure Mother     Miscarriages / Stillbirths Mother     Diabetes Father     Heart Disease Father     High Blood Pressure Father     High Cholesterol Father        REVIEW OF SYSTEMS  6 systems reviewed, pertinent positives per HPI otherwise noted to be negative    PHYSICAL EXAM  Vitals:    09/25/22 1845   BP: (!) 185/101   Pulse: 96   Resp: 16   Temp: 97 °F (36.1 °C)   SpO2: 99%           GENERAL: Patient is well-developed, well-nourished,  no acute distress. Moderate apparent discomfort. Non toxic appearing. HEENT:  Normocephalic, atraumatic. PERRL. Conjunctiva appear normal.  External ears are normal.  MMM. Dentition is poor and necrotic to the gumline across the upper jaw. Patient has noted draining abscess along the left upper inner gumline. Pus and blood are able to be expressed. NECK: Supple with normal ROM. Trachea midline  LUNGS:  Normal work of breathing. Speaking comfortably in full sentences. EXTREMITIES: 2+ distal pulses w/o edema. MUSCULOSKELETAL:  Atraumatic extremities with normal ROM grossly. No obvious bony deformities. SKIN: Warm/dry. No rashes/lesions noted. PSYCHIATRIC: Patient is alert and oriented with normal affect  NEUROLOGIC: Cranial nerves grossly intact. Moves all extremities with equal strength. No gross sensory deficits. Answers questions/follows commands appropriately. ED COURSE/MDM  Nursing notes reviewed. Pt was given the following medications or treatments in the ED: none    Clinical Impression  Based on the presenting complaint, history, and physical exam, multiple diagnoses were considered. Exam and workup here most c/w:  1. Dental abscess    2. Elevated blood pressure reading        I discussed with Jessika Jansen the results of evaluation in the ED, diagnosis, care, and prognosis. The plan is to discharge to home. Patient is in agreement with plan and questions have been answered. I also discussed with Jessika Jansen the reasons which may require a return visit and the importance of follow-up care. The patient is well-appearing, nontoxic, and improved at the time of discharge.   Patient agrees to call to arrange follow-up care as directed. Srini Long understands to return immediately for worsening/change in symptoms. Patient will be started on the following medications from the ED:  Discharge Medication List as of 9/25/2022  7:32 PM        START taking these medications    Details   metroNIDAZOLE (FLAGYL) 500 MG tablet Take 1 tablet by mouth 2 times daily for 7 days, Disp-14 tablet, R-0Normal      ibuprofen (IBU) 600 MG tablet Take 1 tablet by mouth every 6 hours as needed for Pain, Disp-40 tablet, R-0Normal               Disposition  Pt is discharged in stable condition.     Disposition Vitals:  BP (!) 185/101   Pulse 96   Temp 97 °F (36.1 °C) (Oral)   Resp 16   Ht 5' 6\" (1.676 m)   Wt 220 lb (99.8 kg)   LMP 08/16/2022   SpO2 99%   BMI 35.51 kg/m²                            [facility], DO  09/25/22 1950

## 2023-11-08 ENCOUNTER — APPOINTMENT (OUTPATIENT)
Dept: GENERAL RADIOLOGY | Age: 48
End: 2023-11-08
Payer: COMMERCIAL

## 2023-11-08 ENCOUNTER — HOSPITAL ENCOUNTER (EMERGENCY)
Age: 48
Discharge: HOME | End: 2023-11-08
Payer: COMMERCIAL

## 2023-11-08 VITALS
BODY MASS INDEX: 36.96 KG/M2 | OXYGEN SATURATION: 98 % | TEMPERATURE: 97.9 F | HEART RATE: 82 BPM | WEIGHT: 230 LBS | SYSTOLIC BLOOD PRESSURE: 184 MMHG | HEIGHT: 66 IN | RESPIRATION RATE: 15 BRPM | DIASTOLIC BLOOD PRESSURE: 88 MMHG

## 2023-11-08 DIAGNOSIS — R07.9 CHEST PAIN, UNSPECIFIED TYPE: Primary | ICD-10-CM

## 2023-11-08 DIAGNOSIS — Z53.21 ELOPED FROM EMERGENCY DEPARTMENT: ICD-10-CM

## 2023-11-08 LAB
ALBUMIN SERPL-MCNC: 3.9 G/DL (ref 3.4–5)
ALBUMIN/GLOB SERPL: 1.3 {RATIO} (ref 1.1–2.2)
ALP SERPL-CCNC: 93 U/L (ref 40–129)
ALT SERPL-CCNC: 14 U/L (ref 10–40)
ANION GAP SERPL CALCULATED.3IONS-SCNC: 11 MMOL/L (ref 3–16)
AST SERPL-CCNC: 14 U/L (ref 15–37)
BASOPHILS # BLD: 0.2 K/UL (ref 0–0.2)
BASOPHILS NFR BLD: 2.2 %
BILIRUB SERPL-MCNC: 0.5 MG/DL (ref 0–1)
BUN SERPL-MCNC: 13 MG/DL (ref 7–20)
CALCIUM SERPL-MCNC: 9.1 MG/DL (ref 8.3–10.6)
CHLORIDE SERPL-SCNC: 102 MMOL/L (ref 99–110)
CO2 SERPL-SCNC: 23 MMOL/L (ref 21–32)
CREAT SERPL-MCNC: 0.7 MG/DL (ref 0.6–1.1)
DEPRECATED RDW RBC AUTO: 15.2 % (ref 12.4–15.4)
EOSINOPHIL # BLD: 0.3 K/UL (ref 0–0.6)
EOSINOPHIL NFR BLD: 3.6 %
GFR SERPLBLD CREATININE-BSD FMLA CKD-EPI: >60 ML/MIN/{1.73_M2}
GLUCOSE SERPL-MCNC: 103 MG/DL (ref 70–99)
HCT VFR BLD AUTO: 36 % (ref 36–48)
HGB BLD-MCNC: 11.8 G/DL (ref 12–16)
LYMPHOCYTES # BLD: 2 K/UL (ref 1–5.1)
LYMPHOCYTES NFR BLD: 22 %
MAGNESIUM SERPL-MCNC: 1.9 MG/DL (ref 1.8–2.4)
MCH RBC QN AUTO: 24.9 PG (ref 26–34)
MCHC RBC AUTO-ENTMCNC: 32.9 G/DL (ref 31–36)
MCV RBC AUTO: 75.6 FL (ref 80–100)
MONOCYTES # BLD: 0.6 K/UL (ref 0–1.3)
MONOCYTES NFR BLD: 7.1 %
NEUTROPHILS # BLD: 5.9 K/UL (ref 1.7–7.7)
NEUTROPHILS NFR BLD: 65.1 %
PLATELET # BLD AUTO: 287 K/UL (ref 135–450)
PMV BLD AUTO: 8.3 FL (ref 5–10.5)
POTASSIUM SERPL-SCNC: 3.4 MMOL/L (ref 3.5–5.1)
PROT SERPL-MCNC: 6.9 G/DL (ref 6.4–8.2)
RBC # BLD AUTO: 4.75 M/UL (ref 4–5.2)
SODIUM SERPL-SCNC: 136 MMOL/L (ref 136–145)
TROPONIN, HIGH SENSITIVITY: 7 NG/L (ref 0–14)
WBC # BLD AUTO: 9.1 K/UL (ref 4–11)

## 2023-11-08 PROCEDURE — 84484 ASSAY OF TROPONIN QUANT: CPT

## 2023-11-08 PROCEDURE — 4500000002 HC ER NO CHARGE

## 2023-11-08 PROCEDURE — 71046 X-RAY EXAM CHEST 2 VIEWS: CPT

## 2023-11-08 PROCEDURE — 85025 COMPLETE CBC W/AUTO DIFF WBC: CPT

## 2023-11-08 PROCEDURE — 80053 COMPREHEN METABOLIC PANEL: CPT

## 2023-11-08 PROCEDURE — 93005 ELECTROCARDIOGRAM TRACING: CPT | Performed by: EMERGENCY MEDICINE

## 2023-11-08 PROCEDURE — 6370000000 HC RX 637 (ALT 250 FOR IP): Performed by: PHYSICIAN ASSISTANT

## 2023-11-08 PROCEDURE — 83735 ASSAY OF MAGNESIUM: CPT

## 2023-11-08 RX ORDER — ASPIRIN 81 MG/1
243 TABLET, CHEWABLE ORAL ONCE
Status: COMPLETED | OUTPATIENT
Start: 2023-11-08 | End: 2023-11-08

## 2023-11-08 RX ADMIN — ASPIRIN 243 MG: 81 TABLET, CHEWABLE ORAL at 17:02

## 2023-11-08 ASSESSMENT — PAIN DESCRIPTION - LOCATION: LOCATION: CHEST

## 2023-11-08 ASSESSMENT — PAIN - FUNCTIONAL ASSESSMENT: PAIN_FUNCTIONAL_ASSESSMENT: 0-10

## 2023-11-08 ASSESSMENT — PAIN SCALES - GENERAL: PAINLEVEL_OUTOF10: 8

## 2023-11-08 ASSESSMENT — HEART SCORE: ECG: 0

## 2023-11-08 ASSESSMENT — PAIN DESCRIPTION - ORIENTATION: ORIENTATION: MID

## 2023-11-08 ASSESSMENT — PAIN DESCRIPTION - FREQUENCY: FREQUENCY: CONTINUOUS

## 2023-11-08 ASSESSMENT — PAIN DESCRIPTION - DESCRIPTORS: DESCRIPTORS: DISCOMFORT;PRESSURE

## 2023-11-08 ASSESSMENT — PAIN DESCRIPTION - PAIN TYPE: TYPE: ACUTE PAIN

## 2023-11-09 LAB
EKG ATRIAL RATE: 81 BPM
EKG DIAGNOSIS: NORMAL
EKG P AXIS: 1 DEGREES
EKG P-R INTERVAL: 152 MS
EKG Q-T INTERVAL: 392 MS
EKG QRS DURATION: 84 MS
EKG QTC CALCULATION (BAZETT): 455 MS
EKG R AXIS: 37 DEGREES
EKG T AXIS: -12 DEGREES
EKG VENTRICULAR RATE: 81 BPM

## 2023-11-09 PROCEDURE — 93010 ELECTROCARDIOGRAM REPORT: CPT | Performed by: INTERNAL MEDICINE

## 2024-12-22 ENCOUNTER — OFFICE VISIT (OUTPATIENT)
Dept: URGENT CARE | Age: 49
End: 2024-12-22

## 2024-12-22 VITALS
BODY MASS INDEX: 38.9 KG/M2 | OXYGEN SATURATION: 97 % | DIASTOLIC BLOOD PRESSURE: 94 MMHG | SYSTOLIC BLOOD PRESSURE: 162 MMHG | TEMPERATURE: 98.7 F | HEART RATE: 87 BPM | WEIGHT: 241 LBS

## 2024-12-22 DIAGNOSIS — J40 BRONCHITIS: Primary | ICD-10-CM

## 2024-12-22 DIAGNOSIS — R05.1 ACUTE COUGH: ICD-10-CM

## 2024-12-22 DIAGNOSIS — R09.82 POSTNASAL DRIP: ICD-10-CM

## 2024-12-22 DIAGNOSIS — R09.81 NASAL CONGESTION: ICD-10-CM

## 2024-12-22 DIAGNOSIS — R03.0 ELEVATED BLOOD PRESSURE READING WITHOUT DIAGNOSIS OF HYPERTENSION: ICD-10-CM

## 2024-12-22 DIAGNOSIS — Z87.891 HISTORY OF CIGARETTE SMOKING: ICD-10-CM

## 2024-12-22 PROBLEM — R56.9 SEIZURES (HCC): Status: ACTIVE | Noted: 2024-12-22

## 2024-12-22 RX ORDER — DEXTROMETHORPHAN HYDROBROMIDE AND PROMETHAZINE HYDROCHLORIDE 15; 6.25 MG/5ML; MG/5ML
5 SYRUP ORAL 4 TIMES DAILY PRN
Qty: 200 ML | Refills: 0 | Status: SHIPPED | OUTPATIENT
Start: 2024-12-22

## 2024-12-22 RX ORDER — PREDNISONE 10 MG/1
10 TABLET ORAL 2 TIMES DAILY
Qty: 10 TABLET | Refills: 0 | Status: SHIPPED | OUTPATIENT
Start: 2024-12-22 | End: 2024-12-27

## 2024-12-22 RX ORDER — GUAIFENESIN 600 MG/1
600 TABLET, EXTENDED RELEASE ORAL 2 TIMES DAILY
Qty: 20 TABLET | Refills: 0 | Status: SHIPPED | OUTPATIENT
Start: 2024-12-22 | End: 2025-01-01

## 2024-12-22 RX ORDER — AZITHROMYCIN 250 MG/1
TABLET, FILM COATED ORAL
Qty: 6 TABLET | Refills: 0 | Status: SHIPPED | OUTPATIENT
Start: 2024-12-22 | End: 2025-01-01

## 2024-12-22 RX ORDER — DEXTROMETHORPHAN HBR AND PYRILAMINE MALEATE 30; 30 MG/1; MG/1
1 TABLET ORAL 4 TIMES DAILY PRN
Qty: 40 TABLET | Refills: 0 | Status: SHIPPED | OUTPATIENT
Start: 2024-12-22

## 2024-12-22 NOTE — PATIENT INSTRUCTIONS
Azithromycin is prescribed for antibiotic treatment of the bronchitis with your smoking history.  Take the antibiotics until completed, do not stop unless otherwise directed by a healthcare provider.  Recommend daily yogurt or other probiotics while on antibiotics.  Prednisone is prescribed for steroid relief of the airway inflammation of the Bronchitis.  [place] DMT prescribed for cough and congestion relief.  Guaifenesin (Mucinex) prescribed for expectorant therapy.  Promethazine DM prescribed for cough relief.  Do not take other cough medications while on this medication    Recommend OTC treatment for symptoms:  Ibuprofen (Advil, Motrin) and acetaminophen (Tylenol) for fevers and pain relief.  antihistamines (Claritin, Zyrtec, Allegra, or Xyzal) and nasal steroid sprays (Flonase) to help with nasal congestion and runny nose.  throat sprays (Cepacol, chloraseptic) for throat pain.  dextromethorphan (Robitussin, Delsym), throat lozenges, and increased water intake for cough.  honey (1-2 teaspoons every hour) for relief of throat irritation and coughing fits.  warm teas, humidifiers, nasal lavages, and sleeping in an inclined position are also helpful options that can lessen symptoms.  Recommend warm compresses over the symptomatic ear(s) for 10-15 minutes, or a hot shower, followed by 1-2 minutes of massaging the area behind your ears and down the jaw-line to help with the ear congestion/ear pressure.    Follow up with your PCP within 5 days or, if unable, you can return to the clinic if symptoms persist beyond 5 days or if symptoms worsen.  If you develop shortness of breath, increased work of breathing, lightheadedness, or chest pain, contact 911, or follow up immediately with the nearest Emergency Department for further evaluation.      Continue at-home monitoring of BP - recommend keeping a log of your blood pressures to discuss with your PCP.   Follow up with PCP to further evaluate your elevated blood

## 2024-12-23 ASSESSMENT — ENCOUNTER SYMPTOMS: CHEST TIGHTNESS: 1

## 2025-04-08 ENCOUNTER — HOSPITAL ENCOUNTER (OUTPATIENT)
Age: 50
Setting detail: OBSERVATION
Discharge: HOME | End: 2025-04-10
Attending: STUDENT IN AN ORGANIZED HEALTH CARE EDUCATION/TRAINING PROGRAM | Admitting: HOSPITALIST
Payer: COMMERCIAL

## 2025-04-08 ENCOUNTER — APPOINTMENT (OUTPATIENT)
Age: 50
End: 2025-04-08
Payer: COMMERCIAL

## 2025-04-08 DIAGNOSIS — I16.0 HYPERTENSIVE URGENCY: ICD-10-CM

## 2025-04-08 DIAGNOSIS — R07.89 ATYPICAL CHEST PAIN: Primary | ICD-10-CM

## 2025-04-08 PROBLEM — R07.9 CHEST PAIN: Status: ACTIVE | Noted: 2025-04-08

## 2025-04-08 LAB
ALBUMIN SERPL-MCNC: 4 G/DL (ref 3.4–5)
ALBUMIN/GLOB SERPL: 1.4 {RATIO}
ALP SERPL-CCNC: 86 U/L (ref 40–129)
ALT SERPL-CCNC: 28 U/L (ref 10–40)
ANION GAP SERPL CALCULATED.3IONS-SCNC: 9 MMOL/L (ref 3–16)
AST SERPL-CCNC: 23 U/L (ref 15–37)
BASOPHILS # BLD: 0.09 K/UL (ref 0–0.2)
BASOPHILS NFR BLD: 1 %
BILIRUB SERPL-MCNC: 0.3 MG/DL (ref 0–1)
BNP SERPL-MCNC: 51 PG/ML (ref 0–124)
BUN SERPL-MCNC: 11 MG/DL (ref 7–20)
CALCIUM SERPL-MCNC: 8.9 MG/DL (ref 8.3–10.6)
CHLORIDE SERPL-SCNC: 105 MMOL/L (ref 99–110)
CO2 SERPL-SCNC: 23 MMOL/L (ref 21–32)
CREAT SERPL-MCNC: 0.7 MG/DL (ref 0.5–1)
EOSINOPHIL # BLD: 0.4 K/UL (ref 0–0.6)
EOSINOPHILS RELATIVE PERCENT: 5 %
ERYTHROCYTE [DISTWIDTH] IN BLOOD BY AUTOMATED COUNT: 14.4 % (ref 12.4–15.4)
GFR, ESTIMATED: >90 ML/MIN/1.73M2
GLUCOSE SERPL-MCNC: 114 MG/DL (ref 70–99)
HCT VFR BLD AUTO: 40 % (ref 36–48)
HGB BLD-MCNC: 13.2 G/DL (ref 12–16)
IMM GRANULOCYTES # BLD AUTO: 0.01 K/UL (ref 0–0.5)
IMM GRANULOCYTES NFR BLD: 0 %
LYMPHOCYTES NFR BLD: 2.09 K/UL (ref 1–5.1)
LYMPHOCYTES RELATIVE PERCENT: 27 %
MCH RBC QN AUTO: 26 PG (ref 26–34)
MCHC RBC AUTO-ENTMCNC: 33 G/DL (ref 31–36)
MCV RBC AUTO: 78.7 FL (ref 80–100)
MONOCYTES NFR BLD: 0.7 K/UL (ref 0–1.3)
MONOCYTES NFR BLD: 9 %
NEUTROPHILS NFR BLD: 58 %
NEUTS SEG NFR BLD: 4.57 K/UL (ref 1.7–7.7)
PLATELET # BLD AUTO: 255 K/UL (ref 135–450)
PMV BLD AUTO: 10.3 FL
POTASSIUM SERPL-SCNC: 4 MMOL/L (ref 3.5–5.1)
PROT SERPL-MCNC: 6.9 G/DL (ref 6.4–8.2)
RBC # BLD AUTO: 5.08 M/UL (ref 4–5.2)
SODIUM SERPL-SCNC: 138 MMOL/L (ref 136–145)
TROPONIN I SERPL HS-MCNC: <6 NG/L (ref 0–14)
WBC OTHER # BLD: 7.9 K/UL (ref 4–11)

## 2025-04-08 PROCEDURE — 93005 ELECTROCARDIOGRAM TRACING: CPT

## 2025-04-08 PROCEDURE — 96372 THER/PROPH/DIAG INJ SC/IM: CPT

## 2025-04-08 PROCEDURE — 99285 EMERGENCY DEPT VISIT HI MDM: CPT

## 2025-04-08 PROCEDURE — 6360000002 HC RX W HCPCS

## 2025-04-08 PROCEDURE — 6370000000 HC RX 637 (ALT 250 FOR IP): Performed by: HOSPITALIST

## 2025-04-08 PROCEDURE — 2500000003 HC RX 250 WO HCPCS: Performed by: HOSPITALIST

## 2025-04-08 PROCEDURE — G0378 HOSPITAL OBSERVATION PER HR: HCPCS

## 2025-04-08 PROCEDURE — 6360000002 HC RX W HCPCS: Performed by: HOSPITALIST

## 2025-04-08 PROCEDURE — 2580000003 HC RX 258: Performed by: HOSPITALIST

## 2025-04-08 PROCEDURE — 83880 ASSAY OF NATRIURETIC PEPTIDE: CPT

## 2025-04-08 PROCEDURE — 6370000000 HC RX 637 (ALT 250 FOR IP)

## 2025-04-08 PROCEDURE — 36415 COLL VENOUS BLD VENIPUNCTURE: CPT

## 2025-04-08 PROCEDURE — 96374 THER/PROPH/DIAG INJ IV PUSH: CPT

## 2025-04-08 PROCEDURE — 71046 X-RAY EXAM CHEST 2 VIEWS: CPT

## 2025-04-08 PROCEDURE — 80053 COMPREHEN METABOLIC PANEL: CPT

## 2025-04-08 PROCEDURE — 84484 ASSAY OF TROPONIN QUANT: CPT

## 2025-04-08 PROCEDURE — 85025 COMPLETE CBC W/AUTO DIFF WBC: CPT

## 2025-04-08 RX ORDER — POLYETHYLENE GLYCOL 3350 17 G/17G
17 POWDER, FOR SOLUTION ORAL DAILY PRN
Status: DISCONTINUED | OUTPATIENT
Start: 2025-04-08 | End: 2025-04-10 | Stop reason: HOSPADM

## 2025-04-08 RX ORDER — ACETAMINOPHEN 650 MG/1
650 SUPPOSITORY RECTAL EVERY 6 HOURS PRN
Status: DISCONTINUED | OUTPATIENT
Start: 2025-04-08 | End: 2025-04-10 | Stop reason: HOSPADM

## 2025-04-08 RX ORDER — ACETAMINOPHEN 325 MG/1
650 TABLET ORAL EVERY 6 HOURS PRN
Status: DISCONTINUED | OUTPATIENT
Start: 2025-04-08 | End: 2025-04-10 | Stop reason: HOSPADM

## 2025-04-08 RX ORDER — ONDANSETRON 4 MG/1
4 TABLET, ORALLY DISINTEGRATING ORAL EVERY 8 HOURS PRN
Status: DISCONTINUED | OUTPATIENT
Start: 2025-04-08 | End: 2025-04-10 | Stop reason: HOSPADM

## 2025-04-08 RX ORDER — FAMOTIDINE 20 MG/1
20 TABLET, FILM COATED ORAL 2 TIMES DAILY
Status: DISCONTINUED | OUTPATIENT
Start: 2025-04-08 | End: 2025-04-10 | Stop reason: HOSPADM

## 2025-04-08 RX ORDER — SODIUM CHLORIDE 0.9 % (FLUSH) 0.9 %
10 SYRINGE (ML) INJECTION PRN
Status: DISCONTINUED | OUTPATIENT
Start: 2025-04-08 | End: 2025-04-10 | Stop reason: HOSPADM

## 2025-04-08 RX ORDER — SODIUM CHLORIDE 9 MG/ML
INJECTION, SOLUTION INTRAVENOUS CONTINUOUS
Status: DISCONTINUED | OUTPATIENT
Start: 2025-04-08 | End: 2025-04-09

## 2025-04-08 RX ORDER — ASPIRIN 81 MG/1
81 TABLET, CHEWABLE ORAL DAILY
Status: DISCONTINUED | OUTPATIENT
Start: 2025-04-09 | End: 2025-04-10 | Stop reason: HOSPADM

## 2025-04-08 RX ORDER — SODIUM CHLORIDE 9 MG/ML
INJECTION, SOLUTION INTRAVENOUS PRN
Status: DISCONTINUED | OUTPATIENT
Start: 2025-04-08 | End: 2025-04-10 | Stop reason: HOSPADM

## 2025-04-08 RX ORDER — KETOROLAC TROMETHAMINE 15 MG/ML
15 INJECTION, SOLUTION INTRAMUSCULAR; INTRAVENOUS ONCE
Status: COMPLETED | OUTPATIENT
Start: 2025-04-08 | End: 2025-04-08

## 2025-04-08 RX ORDER — NITROGLYCERIN 0.4 MG/1
0.4 TABLET SUBLINGUAL EVERY 5 MIN PRN
Status: DISCONTINUED | OUTPATIENT
Start: 2025-04-08 | End: 2025-04-10 | Stop reason: HOSPADM

## 2025-04-08 RX ORDER — SODIUM CHLORIDE 0.9 % (FLUSH) 0.9 %
10 SYRINGE (ML) INJECTION EVERY 12 HOURS SCHEDULED
Status: DISCONTINUED | OUTPATIENT
Start: 2025-04-08 | End: 2025-04-10 | Stop reason: HOSPADM

## 2025-04-08 RX ORDER — POTASSIUM CHLORIDE 1500 MG/1
40 TABLET, EXTENDED RELEASE ORAL PRN
Status: DISCONTINUED | OUTPATIENT
Start: 2025-04-08 | End: 2025-04-10 | Stop reason: HOSPADM

## 2025-04-08 RX ORDER — ATORVASTATIN CALCIUM 20 MG/1
20 TABLET, FILM COATED ORAL NIGHTLY
Status: DISCONTINUED | OUTPATIENT
Start: 2025-04-08 | End: 2025-04-10 | Stop reason: HOSPADM

## 2025-04-08 RX ORDER — ENOXAPARIN SODIUM 100 MG/ML
40 INJECTION SUBCUTANEOUS DAILY
Status: DISCONTINUED | OUTPATIENT
Start: 2025-04-08 | End: 2025-04-09

## 2025-04-08 RX ORDER — POTASSIUM CHLORIDE 7.45 MG/ML
10 INJECTION INTRAVENOUS PRN
Status: DISCONTINUED | OUTPATIENT
Start: 2025-04-08 | End: 2025-04-10 | Stop reason: HOSPADM

## 2025-04-08 RX ORDER — ONDANSETRON 2 MG/ML
4 INJECTION INTRAMUSCULAR; INTRAVENOUS EVERY 6 HOURS PRN
Status: DISCONTINUED | OUTPATIENT
Start: 2025-04-08 | End: 2025-04-10 | Stop reason: HOSPADM

## 2025-04-08 RX ORDER — METOPROLOL TARTRATE 25 MG/1
25 TABLET, FILM COATED ORAL 2 TIMES DAILY
Status: DISCONTINUED | OUTPATIENT
Start: 2025-04-08 | End: 2025-04-09

## 2025-04-08 RX ADMIN — METOPROLOL TARTRATE 25 MG: 25 TABLET, FILM COATED ORAL at 22:02

## 2025-04-08 RX ADMIN — FAMOTIDINE 20 MG: 20 TABLET, FILM COATED ORAL at 22:02

## 2025-04-08 RX ADMIN — NITROGLYCERIN 0.4 MG: 0.4 TABLET SUBLINGUAL at 16:11

## 2025-04-08 RX ADMIN — SODIUM CHLORIDE, PRESERVATIVE FREE 10 ML: 5 INJECTION INTRAVENOUS at 22:03

## 2025-04-08 RX ADMIN — KETOROLAC TROMETHAMINE 15 MG: 15 INJECTION, SOLUTION INTRAMUSCULAR; INTRAVENOUS at 16:21

## 2025-04-08 RX ADMIN — ENOXAPARIN SODIUM 40 MG: 100 INJECTION SUBCUTANEOUS at 22:05

## 2025-04-08 RX ADMIN — ATORVASTATIN CALCIUM 20 MG: 20 TABLET, FILM COATED ORAL at 22:02

## 2025-04-08 RX ADMIN — SODIUM CHLORIDE: 0.9 INJECTION, SOLUTION INTRAVENOUS at 20:30

## 2025-04-08 ASSESSMENT — PAIN DESCRIPTION - DESCRIPTORS
DESCRIPTORS: DISCOMFORT
DESCRIPTORS: DISCOMFORT

## 2025-04-08 ASSESSMENT — LIFESTYLE VARIABLES
HOW OFTEN DO YOU HAVE A DRINK CONTAINING ALCOHOL: NEVER
HOW MANY STANDARD DRINKS CONTAINING ALCOHOL DO YOU HAVE ON A TYPICAL DAY: PATIENT DOES NOT DRINK

## 2025-04-08 ASSESSMENT — PAIN DESCRIPTION - FREQUENCY: FREQUENCY: INTERMITTENT

## 2025-04-08 ASSESSMENT — PAIN SCALES - GENERAL
PAINLEVEL_OUTOF10: 3
PAINLEVEL_OUTOF10: 6
PAINLEVEL_OUTOF10: 4
PAINLEVEL_OUTOF10: 3
PAINLEVEL_OUTOF10: 5

## 2025-04-08 ASSESSMENT — PAIN - FUNCTIONAL ASSESSMENT
PAIN_FUNCTIONAL_ASSESSMENT: 0-10
PAIN_FUNCTIONAL_ASSESSMENT: ACTIVITIES ARE NOT PREVENTED

## 2025-04-08 ASSESSMENT — PAIN DESCRIPTION - PAIN TYPE: TYPE: ACUTE PAIN

## 2025-04-08 ASSESSMENT — PAIN DESCRIPTION - LOCATION: LOCATION: CHEST

## 2025-04-08 NOTE — ED TRIAGE NOTES
Patient arrives to ED c/o of high BP and chest pain. Pt states it started approx 18 hours ago. Pt has Hx of Afib. Denies blood thinners.

## 2025-04-08 NOTE — ED PROVIDER NOTES
was found to be significantly hypertensive.  Has not had her hypertension treated for the past 2 years.  EKG shows nonspecific changes similar compared to previous.  Troponin is within normal limits.  Labs otherwise reassuring.  Her heart score is a 4.  Patient will be hospitalized for further workup and treatment of her condition.      I, Dr. Gregg, am the primary clinician of record.     1. Atypical chest pain    2. Hypertensive urgency      Comment: Please note this report has been produced using speech recognition software and may contain errors related to that system including errors in grammar, punctuation, and spelling, as well as words and phrases that may be inappropriate. If there are any questions or concerns please feel free to contact the dictating provider for clarification.       Marnie Gregg MD  04/08/25 5133    
course  Nitro administered, patient reports it worsened chest pain. Will give dose of toradol.   CBC without leukocytosis or anemia.  CMP without electrolyte disturbance or renal insufficiency.  BNP within normal limits.  Troponin within normal limits, repeat pending  EKG showed normal sinus rhythm with nonspecific ST abnormality, no evident depression or elevation  CXR showed no acute cardiopulmonary findings    History: 0 low  EKG: +1 nonspec changes  Age: +1 45-65  Risk factors: +2 >3   Troponin: 0 neg    Heart score: 4, which is suggestive of Moderate Score (4-6 points): Risk of MACE of 12-16.6%.    Given patient's heart score of 4, as well as hypertension patient benefit from inpatient admission for further workup and management with cardiology consult.  Patient and patient's family agreed with the plan.  Consulted hospitalist who agreed to admit the patient.    Disposition Considerations (tests considered but not done, Admit vs D/C, Shared Decision Making, Pt Expectation of Test or Tx.):      The patient tolerated their visit well.  The patient and / or the family were informed of the results of any tests, a time was given to answer questions, a plan was proposed and they agreed with plan.    I am the Primary Clinician of Record.  FINAL IMPRESSION      1. Atypical chest pain    2. Hypertensive urgency          DISPOSITION/PLAN     DISPOSITION Admitted 04/08/2025 06:38:05 PM               PATIENT REFERRED TO:  No follow-up provider specified.    DISCHARGE MEDICATIONS:  New Prescriptions    No medications on file       DISCONTINUED MEDICATIONS:  Discontinued Medications    No medications on file              (Please note that portions of this note were completed with a voice recognition program.  Efforts were made to edit the dictations but occasionally words are mis-transcribed.)    YENNI Cleaning (electronically signed)      Mere Oswald PA  04/08/25 9251

## 2025-04-08 NOTE — ED NOTES
ED to Inpatient Handoff SBAR    Patient Name: Lucero Booker   :  1975  49 y.o.   MRN:  [id number]  Preferred Name    ED Room #:    Family/Caregiver Present yes     Chief Complaint Chest Pain       Restraints no   Sitter no   Sepsis Risk Score    Isolation No active isolations   Fall Risk Assessment Presents to emergency department  because of falls (Syncope, seizure, or loss of consciousness): No, Age > 70: No, Altered Mental Status, Intoxication with alcohol or substance confusion (Disorientation, impaired judgment, poor safety awaremess, or inability to follow instructions): No, Impaired Mobility: Ambulates or transfers with assistive devices or assistance; Unable to ambulate or transer.: No, Nursing Judgement: No     Situation  Code Status: Prior Limited Code details: Intubation/Re-intubation No Comment; Defibrillation/Cardioversion No Comment; Chest Compressions No Comment; Resuscitative Medications No Comment; Other No Comment.    Allergies: Penicillins  Weight: Patient Vitals for the past 96 hrs (Last 3 readings):   Weight   25 1525 104.3 kg (230 lb)     Arrived from: home  Hospital Problem/Diagnosis:  Active Problems:    * No active hospital problems. *  Resolved Problems:    * No resolved hospital problems. *    Imaging:   XR CHEST (2 VW)   Final Result      Clear lungs.      Normal cardiomediastinal silhouette.      Cholecystectomy clips.      Electronically signed by Charan Abad        Abnormal labs:   Abnormal Labs Reviewed   CBC WITH AUTO DIFFERENTIAL - Abnormal; Notable for the following components:       Result Value    MCV 78.7 (*)     All other components within normal limits   COMPREHENSIVE METABOLIC PANEL W/ REFLEX TO MG FOR LOW K - Abnormal; Notable for the following components:    Glucose 114 (*)     All other components within normal limits       Background  History:   Past Medical History:   Diagnosis Date    Atrial fibrillation (HCC)        Assessment    Vitals/MEWS:

## 2025-04-09 ENCOUNTER — APPOINTMENT (OUTPATIENT)
Age: 50
End: 2025-04-09
Payer: COMMERCIAL

## 2025-04-09 LAB
ANION GAP SERPL CALCULATED.3IONS-SCNC: 9 MMOL/L (ref 3–16)
BUN SERPL-MCNC: 12 MG/DL (ref 7–20)
CALCIUM SERPL-MCNC: 8.5 MG/DL (ref 8.3–10.6)
CHLORIDE SERPL-SCNC: 107 MMOL/L (ref 99–110)
CHOLEST SERPL-MCNC: 205 MG/DL
CO2 SERPL-SCNC: 24 MMOL/L (ref 21–32)
CREAT SERPL-MCNC: 0.7 MG/DL (ref 0.5–1)
EKG ATRIAL RATE: 84 BPM
EKG DIAGNOSIS: NORMAL
EKG P AXIS: 37 DEGREES
EKG P-R INTERVAL: 144 MS
EKG Q-T INTERVAL: 398 MS
EKG QRS DURATION: 86 MS
EKG QTC CALCULATION (BAZETT): 470 MS
EKG R AXIS: 41 DEGREES
EKG T AXIS: 2 DEGREES
EKG VENTRICULAR RATE: 84 BPM
ERYTHROCYTE [DISTWIDTH] IN BLOOD BY AUTOMATED COUNT: 14.4 % (ref 12.4–15.4)
EST. AVERAGE GLUCOSE BLD GHB EST-MCNC: 111 MG/DL
GFR, ESTIMATED: >90 ML/MIN/1.73M2
GLUCOSE SERPL-MCNC: 114 MG/DL (ref 70–99)
HBA1C MFR BLD: 5.5 %
HCT VFR BLD AUTO: 39.7 % (ref 36–48)
HDLC SERPL-MCNC: 39 MG/DL
HGB BLD-MCNC: 13.2 G/DL (ref 12–16)
LDLC SERPL CALC-MCNC: 135 MG/DL (ref 0–130)
MCH RBC QN AUTO: 26.2 PG (ref 26–34)
MCHC RBC AUTO-ENTMCNC: 33.2 G/DL (ref 31–36)
MCV RBC AUTO: 78.8 FL (ref 80–100)
PLATELET # BLD AUTO: 261 K/UL (ref 135–450)
PMV BLD AUTO: 10.1 FL
POTASSIUM SERPL-SCNC: 4.3 MMOL/L (ref 3.5–5.1)
RBC # BLD AUTO: 5.04 M/UL (ref 4–5.2)
SODIUM SERPL-SCNC: 140 MMOL/L (ref 136–145)
TRIGL SERPL-MCNC: 155 MG/DL
WBC OTHER # BLD: 7.3 K/UL (ref 4–11)

## 2025-04-09 PROCEDURE — 75574 CT ANGIO HRT W/3D IMAGE: CPT

## 2025-04-09 PROCEDURE — 6360000002 HC RX W HCPCS: Performed by: HOSPITALIST

## 2025-04-09 PROCEDURE — 6370000000 HC RX 637 (ALT 250 FOR IP): Performed by: INTERNAL MEDICINE

## 2025-04-09 PROCEDURE — 6360000002 HC RX W HCPCS: Performed by: NURSE PRACTITIONER

## 2025-04-09 PROCEDURE — 2500000003 HC RX 250 WO HCPCS: Performed by: HOSPITALIST

## 2025-04-09 PROCEDURE — 85027 COMPLETE CBC AUTOMATED: CPT

## 2025-04-09 PROCEDURE — 96375 TX/PRO/DX INJ NEW DRUG ADDON: CPT

## 2025-04-09 PROCEDURE — 83036 HEMOGLOBIN GLYCOSYLATED A1C: CPT

## 2025-04-09 PROCEDURE — G0378 HOSPITAL OBSERVATION PER HR: HCPCS

## 2025-04-09 PROCEDURE — 2500000003 HC RX 250 WO HCPCS: Performed by: INTERNAL MEDICINE

## 2025-04-09 PROCEDURE — 6370000000 HC RX 637 (ALT 250 FOR IP): Performed by: NURSE PRACTITIONER

## 2025-04-09 PROCEDURE — 80048 BASIC METABOLIC PNL TOTAL CA: CPT

## 2025-04-09 PROCEDURE — 2580000003 HC RX 258: Performed by: HOSPITALIST

## 2025-04-09 PROCEDURE — 80061 LIPID PANEL: CPT

## 2025-04-09 PROCEDURE — 6370000000 HC RX 637 (ALT 250 FOR IP): Performed by: HOSPITALIST

## 2025-04-09 PROCEDURE — 96372 THER/PROPH/DIAG INJ SC/IM: CPT

## 2025-04-09 PROCEDURE — 96376 TX/PRO/DX INJ SAME DRUG ADON: CPT

## 2025-04-09 PROCEDURE — 6360000004 HC RX CONTRAST MEDICATION: Performed by: INTERNAL MEDICINE

## 2025-04-09 PROCEDURE — 36415 COLL VENOUS BLD VENIPUNCTURE: CPT

## 2025-04-09 RX ORDER — IOPAMIDOL 755 MG/ML
100 INJECTION, SOLUTION INTRAVASCULAR
Status: COMPLETED | OUTPATIENT
Start: 2025-04-09 | End: 2025-04-09

## 2025-04-09 RX ORDER — SODIUM CHLORIDE 9 MG/ML
INJECTION, SOLUTION INTRAVENOUS PRN
Status: DISCONTINUED | OUTPATIENT
Start: 2025-04-09 | End: 2025-04-10 | Stop reason: HOSPADM

## 2025-04-09 RX ORDER — METOPROLOL TARTRATE 50 MG
50 TABLET ORAL
Status: COMPLETED | OUTPATIENT
Start: 2025-04-09 | End: 2025-04-09

## 2025-04-09 RX ORDER — METOPROLOL TARTRATE 50 MG
100 TABLET ORAL
Status: COMPLETED | OUTPATIENT
Start: 2025-04-09 | End: 2025-04-09

## 2025-04-09 RX ORDER — ENOXAPARIN SODIUM 100 MG/ML
30 INJECTION SUBCUTANEOUS 2 TIMES DAILY
Status: DISCONTINUED | OUTPATIENT
Start: 2025-04-09 | End: 2025-04-10 | Stop reason: HOSPADM

## 2025-04-09 RX ORDER — NITROGLYCERIN 0.4 MG/1
0.4 TABLET SUBLINGUAL
Status: COMPLETED | OUTPATIENT
Start: 2025-04-09 | End: 2025-04-09

## 2025-04-09 RX ORDER — HYDROXYZINE PAMOATE 25 MG/1
25 CAPSULE ORAL 3 TIMES DAILY PRN
Status: DISCONTINUED | OUTPATIENT
Start: 2025-04-09 | End: 2025-04-10 | Stop reason: HOSPADM

## 2025-04-09 RX ORDER — SODIUM CHLORIDE 0.9 % (FLUSH) 0.9 %
5-40 SYRINGE (ML) INJECTION EVERY 12 HOURS SCHEDULED
Status: DISCONTINUED | OUTPATIENT
Start: 2025-04-09 | End: 2025-04-10 | Stop reason: HOSPADM

## 2025-04-09 RX ORDER — AMLODIPINE BESYLATE 5 MG/1
5 TABLET ORAL DAILY
Status: DISCONTINUED | OUTPATIENT
Start: 2025-04-09 | End: 2025-04-10

## 2025-04-09 RX ORDER — LISINOPRIL 20 MG/1
20 TABLET ORAL DAILY
Status: DISCONTINUED | OUTPATIENT
Start: 2025-04-09 | End: 2025-04-10 | Stop reason: HOSPADM

## 2025-04-09 RX ORDER — NITROGLYCERIN 0.4 MG/1
0.8 TABLET SUBLINGUAL
Status: COMPLETED | OUTPATIENT
Start: 2025-04-09 | End: 2025-04-09

## 2025-04-09 RX ORDER — HYDRALAZINE HYDROCHLORIDE 20 MG/ML
10 INJECTION INTRAMUSCULAR; INTRAVENOUS EVERY 6 HOURS PRN
Status: COMPLETED | OUTPATIENT
Start: 2025-04-09 | End: 2025-04-09

## 2025-04-09 RX ORDER — AMLODIPINE BESYLATE 5 MG/1
10 TABLET ORAL DAILY
Status: DISCONTINUED | OUTPATIENT
Start: 2025-04-09 | End: 2025-04-09

## 2025-04-09 RX ORDER — SODIUM CHLORIDE 0.9 % (FLUSH) 0.9 %
5-40 SYRINGE (ML) INJECTION PRN
Status: DISCONTINUED | OUTPATIENT
Start: 2025-04-09 | End: 2025-04-10 | Stop reason: HOSPADM

## 2025-04-09 RX ORDER — METOPROLOL TARTRATE 50 MG
150 TABLET ORAL
Status: COMPLETED | OUTPATIENT
Start: 2025-04-09 | End: 2025-04-09

## 2025-04-09 RX ADMIN — IOPAMIDOL 100 ML: 755 INJECTION, SOLUTION INTRAVENOUS at 12:49

## 2025-04-09 RX ADMIN — SODIUM CHLORIDE, PRESERVATIVE FREE 10 ML: 5 INJECTION INTRAVENOUS at 19:54

## 2025-04-09 RX ADMIN — Medication 10 ML: at 19:55

## 2025-04-09 RX ADMIN — ENOXAPARIN SODIUM 30 MG: 100 INJECTION SUBCUTANEOUS at 19:54

## 2025-04-09 RX ADMIN — HYDRALAZINE HYDROCHLORIDE 10 MG: 20 INJECTION INTRAMUSCULAR; INTRAVENOUS at 19:54

## 2025-04-09 RX ADMIN — FAMOTIDINE 20 MG: 20 TABLET, FILM COATED ORAL at 10:22

## 2025-04-09 RX ADMIN — SODIUM CHLORIDE, PRESERVATIVE FREE 10 ML: 5 INJECTION INTRAVENOUS at 10:23

## 2025-04-09 RX ADMIN — METOPROLOL TARTRATE 50 MG: 50 TABLET, FILM COATED ORAL at 10:22

## 2025-04-09 RX ADMIN — HYDRALAZINE HYDROCHLORIDE 10 MG: 20 INJECTION INTRAMUSCULAR; INTRAVENOUS at 14:04

## 2025-04-09 RX ADMIN — ATORVASTATIN CALCIUM 20 MG: 20 TABLET, FILM COATED ORAL at 19:54

## 2025-04-09 RX ADMIN — LISINOPRIL 20 MG: 20 TABLET ORAL at 13:56

## 2025-04-09 RX ADMIN — FAMOTIDINE 20 MG: 20 TABLET, FILM COATED ORAL at 19:54

## 2025-04-09 RX ADMIN — AMLODIPINE BESYLATE 5 MG: 5 TABLET ORAL at 13:56

## 2025-04-09 RX ADMIN — NITROGLYCERIN 0.2 MG: 0.4 TABLET SUBLINGUAL at 13:07

## 2025-04-09 RX ADMIN — SODIUM CHLORIDE: 0.9 INJECTION, SOLUTION INTRAVENOUS at 11:45

## 2025-04-09 RX ADMIN — ASPIRIN 81 MG: 81 TABLET, CHEWABLE ORAL at 10:22

## 2025-04-09 RX ADMIN — ACETAMINOPHEN 650 MG: 325 TABLET ORAL at 14:10

## 2025-04-09 ASSESSMENT — PAIN - FUNCTIONAL ASSESSMENT: PAIN_FUNCTIONAL_ASSESSMENT: ACTIVITIES ARE NOT PREVENTED

## 2025-04-09 ASSESSMENT — PAIN DESCRIPTION - LOCATION: LOCATION: HEAD

## 2025-04-09 ASSESSMENT — PAIN SCALES - GENERAL
PAINLEVEL_OUTOF10: 5
PAINLEVEL_OUTOF10: 3
PAINLEVEL_OUTOF10: 0

## 2025-04-09 ASSESSMENT — PAIN DESCRIPTION - DESCRIPTORS: DESCRIPTORS: ACHING

## 2025-04-09 ASSESSMENT — PAIN DESCRIPTION - ORIENTATION: ORIENTATION: ANTERIOR

## 2025-04-09 NOTE — PROGRESS NOTES
[facility] Medicine Progress Note  V 1.6      Date of Admission: 4/8/2025    Hospital Day: 2      Chief Admission Complaint:      Subjective: Patient seen and examined this morning at bedside.  She denies any complaints of chest pain or palpitations no nausea or vomiting.  She is awaiting CTA coronary.  Patient noted to have elevated blood pressure and has not been on blood pressure medications at home    Presenting Admission History:       The patient Lucero Booker is a 49 y.o.female with medical history significant for hypertension and atrial fibrillation.  Patient reports that she has not seen her primary care physician in the last 2 years and she has not been taking any medication.  Patient reports pressure-like substernal chest pain with palpitation.  Patient denies any previous cardiac history no recent history of any cardiac testing patient denies shortness of breath.  No history of fever chills no history of nausea vomiting or diarrhea     Assessment/Plan:      Current Principal Problem:  Chest pain    Chest pain, unspecified R07.9  Atypical chest pain  Admit patient to telemetry  Trend cardiac enzymes  If patient rules out for MI   CTA coronary-normal finding  Start patient on PPI  Continue patient on aspirin, beta blocker and statin  Consult to cardiology     Pain management  R52  Continue with the IV and oral pain medication     Hypertensive urgency  Systolic blood pressure has been high as 220  Start patient on lisinopril 20 mg and amlodipine 10 mg  Hydralazine on as-needed basis  Keep systolic blood pressure less than 170  Monitor blood pressure trend       Ongoing threat to life and/or bodily function without ongoing treatment due to: Blood pressure management    Consults:      IP CONSULT TO HOSPITALIST  IP CONSULT TO CARDIOLOGY        --------------------------------------------------      Medications:        Infusion Medications    sodium chloride      sodium chloride 75 mL/hr at 04/09/25 3078

## 2025-04-09 NOTE — PROGRESS NOTES
Pharmacist Review and Automatic Dose Adjustment of Prophylactic Enoxaparin    The reviewing pharmacist has made an adjustment to the ordered enoxaparin dose or converted to UFH per the approved [facility] protocol and table as identified below.        Lucero Booker is a 49 y.o. female.     Recent Labs     04/08/25  1600 04/09/25  0428   CREATININE 0.7 0.7       Estimated Creatinine Clearance: 119 mL/min (based on SCr of 0.7 mg/dL).    Recent Labs     04/08/25  1600 04/09/25  0428   HGB 13.2 13.2   HCT 40.0 39.7    261     No results for input(s): \"INR\" in the last 72 hours.    Height:   Ht Readings from Last 1 Encounters:   04/08/25 1.676 m (5' 6\")     Weight:  Wt Readings from Last 1 Encounters:   04/08/25 104.3 kg (230 lb)       Plan: Based upon the patient's weight and renal function, the ordered enoxaparin dose of 40 mg daily has been changed/converted to 30 mg twice daily.      Thank you,    Dustin Eason, PharmD  [facility]   [id number]  4/9/2025   9:27 AM

## 2025-04-09 NOTE — FLOWSHEET NOTE
04/09/25 1337   Vital Signs   Pulse 62   Heart Rate Source Monitor   Respirations 18   BP (!) 192/90   MAP (Calculated) 124   BP Location Left upper arm   BP Method Automatic   Patient Position Semi fowlers     Pt returned from coronary cta. BP elevated. Pt reports having a headache, 5/10. KVNG Ponce notified. Will give lisinopril as ordered.

## 2025-04-09 NOTE — CONSULTS
CARDIOLOGY CONSULTATION        Patient Name: Lucero Booker  Date of admission: 4/8/2025  3:20 PM  Admission Dx: Chest pain [R07.9]  Atypical chest pain [R07.89]  Hypertensive urgency [I16.0]  Requesting Physician: Marjorie Agarwal MD  Primary Care physician: Irlanda Ohara APRN - CNP    Reason for Consultation/Chief Complaint: chest pain, hypertensive urgency     History of Present Illness:     Lucero Booker is a 49 y.o. patient who presented to the hospital with complaints of substernal chest discomfort that began while watching TV. No exertional symptoms noted. She has a Pmh of Htn, however, has not seen her doctor or had medications for 2 years.     ED course/Vital signs on presentation: / 93 HR 78. Troponin neg X3.     Patient reports she had chest pain for about 18 hours before initially checking her blood pressure, she got it in the 220s at home, called her family doctor and decided to come in to the ER. This morning she is feeling somewhat better. She does report a family history of heart problems.       Past Medical History:   has no past medical history on file.    Surgical History:   has a past surgical history that includes Cholecystectomy (11-6-2015).     Social History:   reports that she has been smoking. She has a 15 pack-year smoking history. She has never used smokeless tobacco. She reports current alcohol use. She reports that she does not use drugs.     Family History:  family history includes Asthma in her mother; Diabetes in her father; Heart Disease in her father and mother; High Blood Pressure in her father and mother; High Cholesterol in her father; Miscarriages / Stillbirths in her mother.      Home Medications:  Were reviewed and are listed in nursing record and/or below  Prior to Admission medications    Not on File        CURRENT Medications:  nitroGLYCERIN (NITROSTAT) SL tablet 0.4 mg, Once PRN   Or  nitroGLYCERIN (NITROSTAT) SL tablet 0.8 mg, Once PRN  sodium

## 2025-04-09 NOTE — H&P
Hospitalist  History and Physical    Patient:  Lucero Booker  MRN: [id number]  PCP: Irlanda Ohara APRN - CNP    CHIEF COMPLAINT: Chest pain      HISTORY OF PRESENT ILLNESS:   The patient Lucero Booker is a 49 y.o.female with medical history significant for hypertension and atrial fibrillation.  Patient reports that she has not seen her primary care physician in the last 2 years and she has not been taking any medication.  Patient reports pressure-like substernal chest pain with palpitation.  Patient denies any previous cardiac history no recent history of any cardiac testing patient denies shortness of breath.  No history of fever chills no history of nausea vomiting or diarrhea      Past Medical History:        Diagnosis Date    Atrial fibrillation (HCC)        Past Surgical History:        Procedure Laterality Date    CHOLECYSTECTOMY  11-6-2015       Medications Prior to Admission:    Prior to Admission medications    Medication Sig Start Date End Date Taking? Authorizing Provider   Dextromethorphan-Pyrilamine (CAPRON DMT) 30-30 MG TABS Take 1 tablet by mouth 4 times daily as needed (cough and congestion) 12/22/24   Zeb Winters PA-C   promethazine-dextromethorphan (PROMETHAZINE-DM) 6.25-15 MG/5ML syrup Take 5 mLs by mouth 4 times daily as needed for Cough 12/22/24   Zeb Winters PA-C       Allergies:  Penicillins      Social History:   TOBACCO:   reports that she has been smoking. She has a 15 pack-year smoking history. She has never used smokeless tobacco.  ETOH:   reports current alcohol use.        Family History:       Problem Relation Age of Onset    Asthma Mother     Heart Disease Mother     High Blood Pressure Mother     Miscarriages / Stillbirths Mother     Diabetes Father     Heart Disease Father     High Blood Pressure Father     High Cholesterol Father            REVIEW OF SYSTEMS:     patients reported symptoms are in BOLD all other symptoms are

## 2025-04-09 NOTE — PROGRESS NOTES
4 Eyes Skin Assessment     NAME:  Lucero Booker  YOB: 1975  MEDICAL RECORD NUMBER:  [id number]    The patient is being assessed for  Admission    I agree that at least one RN has performed a thorough Head to Toe Skin Assessment on the patient. ALL assessment sites listed below have been assessed.      Areas assessed by both nurses:    Head, Face, Ears, Shoulders, Back, Chest, Arms, Elbows, Hands, Sacrum. Buttock, Coccyx, Ischium, Legs. Feet and Heels, and Under Medical Devices         Does the Patient have a Wound? No noted wound(s)       Nelson Prevention initiated by RN: No  Wound Care Orders initiated by RN: No    Pressure Injury (Stage 3,4, Unstageable, DTI, NWPT, and Complex wounds) if present, place Wound referral order by RN under : No    New Ostomies, if present place, Ostomy referral order under : No     Nurse 1 eSignature: Electronically signed by Precious Maharaj RN on 4/8/25 at 10:21 PM EDT    **SHARE this note so that the co-signing nurse can place an eSignature**    Nurse 2 eSignature: Electronically signed by Tierney Kovacs RN on 4/8/25 at 10:58 PM EDT

## 2025-04-09 NOTE — DISCHARGE SUMMARY
Hospital Medicine Discharge Summary    Patient ID: Lucero Booker      Patient's PCP: Irlanda Ohara APRN - CNP    Admit Date: 4/8/2025     Discharge Date:   4/9/2025    Admitting Physician: Marjorie Agarwal MD     Discharge Physician: JOHNNY Singh CNP     Discharge Diagnoses  Atypical chest pain resolved  Hypertensive urgency  Mixed dyslipidemia  Uncontrolled hypertension  Obesity  Nicotine dependence      Hospital Course: The patient Lucero Booker is a 49 y.o.female with medical history significant for hypertension and atrial fibrillation.  Patient reports that she has not seen her primary care physician in the last 2 years and she has not been taking any medication.  Patient reports pressure-like substernal chest pain with palpitation.  Patient denies any previous cardiac history no recent history of any cardiac testing patient denies shortness of breath.  No history of fever chills no history of nausea vomiting or diarrhea   Patient was noted to be hypertensive upon arrival to the emergency room with systolic blood pressure in the 200s.  Her EKG showed nonspecific changes similar to compared to previous tracings.  Troponin was within normal limits.  Laboratories were unremarkable.  Heart score is 4.  Patient was admitted to the hospital for further evaluation and treatment.    During hospitalization she was evaluated by cardiology who recommended CTA coronary which result no coronary artery stenosis.  Calcium score 0  Lipid panel was elevated cholesterol 205, , triglycerides 170, goal of LDL less than 70.  Patient was started on Lipitor 40 mg daily  Patient was started on antihypertensive lisinopril and amlodipine and discussed importance of medication compliance.  Recommended lifestyle changes, low-fat and low-sodium diet, monitor blood pressure trends keep daily log.  Follow-up with primary care provider closely after discharge  Discussed lifestyle changes DASH diet and

## 2025-04-10 VITALS
SYSTOLIC BLOOD PRESSURE: 164 MMHG | HEART RATE: 79 BPM | BODY MASS INDEX: 36.96 KG/M2 | TEMPERATURE: 98.2 F | HEIGHT: 66 IN | WEIGHT: 230 LBS | DIASTOLIC BLOOD PRESSURE: 88 MMHG | OXYGEN SATURATION: 99 % | RESPIRATION RATE: 18 BRPM

## 2025-04-10 PROCEDURE — 2500000003 HC RX 250 WO HCPCS: Performed by: HOSPITALIST

## 2025-04-10 PROCEDURE — 2500000003 HC RX 250 WO HCPCS: Performed by: INTERNAL MEDICINE

## 2025-04-10 PROCEDURE — G0378 HOSPITAL OBSERVATION PER HR: HCPCS

## 2025-04-10 PROCEDURE — 6360000002 HC RX W HCPCS: Performed by: HOSPITALIST

## 2025-04-10 PROCEDURE — 6370000000 HC RX 637 (ALT 250 FOR IP): Performed by: HOSPITALIST

## 2025-04-10 PROCEDURE — 6370000000 HC RX 637 (ALT 250 FOR IP): Performed by: NURSE PRACTITIONER

## 2025-04-10 PROCEDURE — 6370000000 HC RX 637 (ALT 250 FOR IP): Performed by: INTERNAL MEDICINE

## 2025-04-10 RX ORDER — LISINOPRIL 20 MG/1
20 TABLET ORAL DAILY
Qty: 30 TABLET | Refills: 3 | Status: SHIPPED | OUTPATIENT
Start: 2025-04-10

## 2025-04-10 RX ORDER — ATORVASTATIN CALCIUM 40 MG/1
40 TABLET, FILM COATED ORAL NIGHTLY
Qty: 90 TABLET | Refills: 0 | Status: SHIPPED | OUTPATIENT
Start: 2025-04-10

## 2025-04-10 RX ORDER — AMLODIPINE BESYLATE 10 MG/1
10 TABLET ORAL DAILY
Qty: 30 TABLET | Refills: 3 | Status: SHIPPED | OUTPATIENT
Start: 2025-04-10

## 2025-04-10 RX ORDER — LABETALOL HYDROCHLORIDE 5 MG/ML
10 INJECTION, SOLUTION INTRAVENOUS EVERY 6 HOURS PRN
Status: DISCONTINUED | OUTPATIENT
Start: 2025-04-10 | End: 2025-04-10 | Stop reason: HOSPADM

## 2025-04-10 RX ORDER — AMLODIPINE BESYLATE 5 MG/1
10 TABLET ORAL DAILY
Status: DISCONTINUED | OUTPATIENT
Start: 2025-04-10 | End: 2025-04-10 | Stop reason: HOSPADM

## 2025-04-10 RX ADMIN — ACETAMINOPHEN 650 MG: 325 TABLET ORAL at 06:27

## 2025-04-10 RX ADMIN — SODIUM CHLORIDE, PRESERVATIVE FREE 10 ML: 5 INJECTION INTRAVENOUS at 08:46

## 2025-04-10 RX ADMIN — LISINOPRIL 20 MG: 20 TABLET ORAL at 08:45

## 2025-04-10 RX ADMIN — FAMOTIDINE 20 MG: 20 TABLET, FILM COATED ORAL at 08:45

## 2025-04-10 RX ADMIN — Medication 10 ML: at 08:46

## 2025-04-10 RX ADMIN — AMLODIPINE BESYLATE 10 MG: 5 TABLET ORAL at 08:45

## 2025-04-10 RX ADMIN — ASPIRIN 81 MG: 81 TABLET, CHEWABLE ORAL at 08:45

## 2025-04-10 ASSESSMENT — PAIN DESCRIPTION - LOCATION: LOCATION: HEAD

## 2025-04-10 ASSESSMENT — PAIN DESCRIPTION - DESCRIPTORS: DESCRIPTORS: ACHING

## 2025-04-10 ASSESSMENT — PAIN SCALES - GENERAL
PAINLEVEL_OUTOF10: 0
PAINLEVEL_OUTOF10: 6

## 2025-04-10 ASSESSMENT — PAIN - FUNCTIONAL ASSESSMENT: PAIN_FUNCTIONAL_ASSESSMENT: ACTIVITIES ARE NOT PREVENTED

## 2025-04-10 ASSESSMENT — PAIN DESCRIPTION - ONSET: ONSET: GRADUAL

## 2025-04-10 ASSESSMENT — PAIN DESCRIPTION - FREQUENCY: FREQUENCY: INTERMITTENT

## 2025-04-10 ASSESSMENT — PAIN DESCRIPTION - ORIENTATION: ORIENTATION: ANTERIOR

## 2025-04-10 ASSESSMENT — PAIN DESCRIPTION - PAIN TYPE: TYPE: ACUTE PAIN

## 2025-04-10 NOTE — PLAN OF CARE
Problem: Discharge Planning  Goal: Discharge to home or other facility with appropriate resources  Outcome: Progressing  Flowsheets (Taken 4/9/2025 2145)  Discharge to home or other facility with appropriate resources: Identify barriers to discharge with patient and caregiver     Problem: Pain  Goal: Verbalizes/displays adequate comfort level or baseline comfort level  Outcome: Progressing  Flowsheets (Taken 4/9/2025 2145)  Verbalizes/displays adequate comfort level or baseline comfort level:   Encourage patient to monitor pain and request assistance   Assess pain using appropriate pain scale

## 2025-04-10 NOTE — DISCHARGE INSTRUCTIONS
Follow-up with primary care provider 1 week after discharge    Recommend lifestyle changes, low-sodium diet, DASH diet.  Monitor daily blood pressures keep a log to review with primary care provider.    Discussed importance of medication compliance and taking on regular schedule.

## 2025-04-10 NOTE — PLAN OF CARE
Problem: Discharge Planning  Goal: Discharge to home or other facility with appropriate resources  4/10/2025 0928 by Edilma Jacobsen, BRIAN  Outcome: Adequate for Discharge  4/9/2025 2145 by Dionna Montes RN  Outcome: Progressing  Flowsheets (Taken 4/9/2025 2145)  Discharge to home or other facility with appropriate resources: Identify barriers to discharge with patient and caregiver     Problem: Pain  Goal: Verbalizes/displays adequate comfort level or baseline comfort level  4/10/2025 0928 by Edilma Jacobsen, BRIAN  Outcome: Adequate for Discharge  4/9/2025 2145 by Dionna Montes RN  Outcome: Progressing  Flowsheets (Taken 4/9/2025 2145)  Verbalizes/displays adequate comfort level or baseline comfort level:   Encourage patient to monitor pain and request assistance   Assess pain using appropriate pain scale   Patient goals are adequate for discharge

## 2025-04-10 NOTE — PROGRESS NOTES
Patient alert and oriented x 4 and up ad kaylah. Patient voiding via BRP. Patient tolerating diet with no c/o nausea or pain at this time. Initial assessment completed, see flowsheet. Patient's bed is locked and in lowest position, side rails up x 2. Non slip socks applied. BP elevated at 180/104, PRN hydralazine given per order, see MAR.

## 2025-04-10 NOTE — PROGRESS NOTES
Nurse went over patients AVS at this time. [facility].

## 2025-04-10 NOTE — PROGRESS NOTES
Patient's BP elevated at 168/72 with no PRN's available. This RN notified Dr. Carter via perfect serve. See new order for PRN labetalol for SBP>180 or DBP>110.